# Patient Record
Sex: MALE | Race: WHITE | NOT HISPANIC OR LATINO | Employment: OTHER | URBAN - METROPOLITAN AREA
[De-identification: names, ages, dates, MRNs, and addresses within clinical notes are randomized per-mention and may not be internally consistent; named-entity substitution may affect disease eponyms.]

---

## 2018-01-03 ENCOUNTER — OFFICE VISIT CONVERTED (OUTPATIENT)
Dept: ORTHOPEDIC SURGERY | Facility: CLINIC | Age: 53
End: 2018-01-03
Attending: PHYSICIAN ASSISTANT

## 2018-01-31 ENCOUNTER — CONVERSION ENCOUNTER (OUTPATIENT)
Dept: ORTHOPEDIC SURGERY | Facility: CLINIC | Age: 53
End: 2018-01-31

## 2018-01-31 ENCOUNTER — OFFICE VISIT CONVERTED (OUTPATIENT)
Dept: ORTHOPEDIC SURGERY | Facility: CLINIC | Age: 53
End: 2018-01-31
Attending: PHYSICIAN ASSISTANT

## 2018-03-09 ENCOUNTER — OFFICE VISIT CONVERTED (OUTPATIENT)
Dept: ORTHOPEDIC SURGERY | Facility: CLINIC | Age: 53
End: 2018-03-09
Attending: PHYSICIAN ASSISTANT

## 2018-04-23 ENCOUNTER — OFFICE VISIT CONVERTED (OUTPATIENT)
Dept: ORTHOPEDIC SURGERY | Facility: CLINIC | Age: 53
End: 2018-04-23
Attending: PHYSICIAN ASSISTANT

## 2020-08-19 ENCOUNTER — HOSPITAL ENCOUNTER (OUTPATIENT)
Dept: URGENT CARE | Facility: CLINIC | Age: 55
Discharge: HOME | End: 2020-08-19
Attending: PHYSICIAN ASSISTANT

## 2020-08-22 LAB — SARS-COV-2 RNA SPEC QL NAA+PROBE: NOT DETECTED

## 2021-05-16 VITALS — BODY MASS INDEX: 34.46 KG/M2 | OXYGEN SATURATION: 97 % | WEIGHT: 260 LBS | HEIGHT: 73 IN | HEART RATE: 72 BPM

## 2021-05-16 VITALS — HEIGHT: 73 IN | HEART RATE: 93 BPM | WEIGHT: 260 LBS | BODY MASS INDEX: 34.46 KG/M2 | OXYGEN SATURATION: 95 %

## 2021-05-16 VITALS — BODY MASS INDEX: 34.72 KG/M2 | HEART RATE: 73 BPM | OXYGEN SATURATION: 97 % | WEIGHT: 262 LBS | HEIGHT: 73 IN

## 2021-05-26 ENCOUNTER — HOSPITAL ENCOUNTER (OUTPATIENT)
Dept: CARDIOLOGY | Facility: HOSPITAL | Age: 56
Discharge: HOME | End: 2021-05-26
Attending: PHYSICIAN ASSISTANT

## 2021-05-27 ENCOUNTER — HOSPITAL ENCOUNTER (OUTPATIENT)
Dept: OTHER | Facility: HOSPITAL | Age: 56
Discharge: HOME | End: 2021-05-27
Attending: PHYSICIAN ASSISTANT

## 2021-05-27 LAB
25(OH)D3 SERPL-MCNC: 25.8 NG/ML (ref 30–100)
ALBUMIN SERPL-MCNC: 4 G/DL (ref 3.5–5)
ALBUMIN/GLOB SERPL: 1 {RATIO} (ref 1.4–2.6)
ALP SERPL-CCNC: 69 U/L (ref 56–119)
ALT SERPL-CCNC: 40 U/L (ref 10–40)
ANION GAP SERPL CALC-SCNC: 11 MMOL/L (ref 8–19)
AST SERPL-CCNC: 41 U/L (ref 15–50)
BASOPHILS # BLD AUTO: 0.05 10*3/UL (ref 0–0.2)
BASOPHILS NFR BLD AUTO: 1.1 % (ref 0–3)
BILIRUB SERPL-MCNC: 0.92 MG/DL (ref 0.2–1.3)
BUN SERPL-MCNC: 11 MG/DL (ref 5–25)
BUN/CREAT SERPL: 11 {RATIO} (ref 6–20)
CALCIUM SERPL-MCNC: 9.2 MG/DL (ref 8.7–10.4)
CHLORIDE SERPL-SCNC: 105 MMOL/L (ref 99–111)
CHOLEST SERPL-MCNC: 165 MG/DL (ref 107–200)
CHOLEST/HDLC SERPL: 3.4 {RATIO} (ref 3–6)
CONV ABS IMM GRAN: 0.02 10*3/UL (ref 0–0.2)
CONV CO2: 27 MMOL/L (ref 22–32)
CONV IMMATURE GRAN: 0.4 % (ref 0–1.8)
CONV TOTAL PROTEIN: 7.9 G/DL (ref 6.3–8.2)
CREAT UR-MCNC: 1.02 MG/DL (ref 0.7–1.2)
DEPRECATED RDW RBC AUTO: 46 FL (ref 35.1–43.9)
EOSINOPHIL # BLD AUTO: 0.11 10*3/UL (ref 0–0.7)
EOSINOPHIL # BLD AUTO: 2.4 % (ref 0–7)
ERYTHROCYTE [DISTWIDTH] IN BLOOD BY AUTOMATED COUNT: 13.1 % (ref 11.6–14.4)
EST. AVERAGE GLUCOSE BLD GHB EST-MCNC: 117 MG/DL
GFR SERPLBLD BASED ON 1.73 SQ M-ARVRAT: >60 ML/MIN/{1.73_M2}
GLOBULIN UR ELPH-MCNC: 3.9 G/DL (ref 2–3.5)
GLUCOSE SERPL-MCNC: 109 MG/DL (ref 70–99)
HBA1C MFR BLD: 5.7 % (ref 3.5–5.7)
HCT VFR BLD AUTO: 43.7 % (ref 42–52)
HDLC SERPL-MCNC: 48 MG/DL (ref 40–60)
HGB BLD-MCNC: 14.6 G/DL (ref 14–18)
LDLC SERPL CALC-MCNC: 103 MG/DL (ref 70–100)
LYMPHOCYTES # BLD AUTO: 1.12 10*3/UL (ref 1–5)
LYMPHOCYTES NFR BLD AUTO: 24.3 % (ref 20–45)
MCH RBC QN AUTO: 31.8 PG (ref 27–31)
MCHC RBC AUTO-ENTMCNC: 33.4 G/DL (ref 33–37)
MCV RBC AUTO: 95.2 FL (ref 80–96)
MONOCYTES # BLD AUTO: 0.42 10*3/UL (ref 0.2–1.2)
MONOCYTES NFR BLD AUTO: 9.1 % (ref 3–10)
NEUTROPHILS # BLD AUTO: 2.88 10*3/UL (ref 2–8)
NEUTROPHILS NFR BLD AUTO: 62.7 % (ref 30–85)
NRBC CBCN: 0 % (ref 0–0.7)
OSMOLALITY SERPL CALC.SUM OF ELEC: 288 MOSM/KG (ref 273–304)
PLATELET # BLD AUTO: 131 10*3/UL (ref 130–400)
PMV BLD AUTO: 11.1 FL (ref 9.4–12.4)
POTASSIUM SERPL-SCNC: 4.4 MMOL/L (ref 3.5–5.3)
PSA SERPL-MCNC: 1.18 NG/ML (ref 0–4)
RBC # BLD AUTO: 4.59 10*6/UL (ref 4.7–6.1)
SODIUM SERPL-SCNC: 139 MMOL/L (ref 135–147)
T4 FREE SERPL-MCNC: 1.2 NG/DL (ref 0.9–1.8)
TRIGL SERPL-MCNC: 71 MG/DL (ref 40–150)
TSH SERPL-ACNC: 4.28 M[IU]/L (ref 0.27–4.2)
VLDLC SERPL-MCNC: 14 MG/DL (ref 5–37)
WBC # BLD AUTO: 4.6 10*3/UL (ref 4.8–10.8)

## 2021-06-02 ENCOUNTER — TRANSCRIBE ORDERS (OUTPATIENT)
Dept: ADMINISTRATIVE | Facility: HOSPITAL | Age: 56
End: 2021-06-02

## 2021-06-02 DIAGNOSIS — R07.89 ATYPICAL CHEST PAIN: Primary | ICD-10-CM

## 2021-06-17 ENCOUNTER — APPOINTMENT (OUTPATIENT)
Dept: NUCLEAR MEDICINE | Facility: HOSPITAL | Age: 56
End: 2021-06-17

## 2021-07-21 ENCOUNTER — OFFICE VISIT (OUTPATIENT)
Dept: CARDIOLOGY | Facility: CLINIC | Age: 56
End: 2021-07-21

## 2021-07-21 VITALS
DIASTOLIC BLOOD PRESSURE: 82 MMHG | HEIGHT: 73 IN | BODY MASS INDEX: 33.53 KG/M2 | HEART RATE: 62 BPM | WEIGHT: 253 LBS | SYSTOLIC BLOOD PRESSURE: 128 MMHG

## 2021-07-21 DIAGNOSIS — R07.2 PRECORDIAL PAIN: Primary | ICD-10-CM

## 2021-07-21 PROCEDURE — 99203 OFFICE O/P NEW LOW 30 MIN: CPT | Performed by: INTERNAL MEDICINE

## 2021-07-21 RX ORDER — TAMSULOSIN HYDROCHLORIDE 0.4 MG/1
1 CAPSULE ORAL DAILY
COMMUNITY
Start: 2021-06-16 | End: 2021-07-21 | Stop reason: SDUPTHER

## 2021-07-21 RX ORDER — MULTIPLE VITAMINS W/ MINERALS TAB 9MG-400MCG
1 TAB ORAL DAILY
COMMUNITY

## 2021-07-21 RX ORDER — ERGOCALCIFEROL 1.25 MG/1
50000 CAPSULE ORAL DAILY
COMMUNITY
End: 2022-03-31

## 2021-07-21 RX ORDER — TAMSULOSIN HYDROCHLORIDE 0.4 MG/1
1 CAPSULE ORAL DAILY
Qty: 30 CAPSULE | Refills: 2 | Status: SHIPPED | OUTPATIENT
Start: 2021-07-21 | End: 2022-03-31

## 2021-07-21 RX ORDER — LORAZEPAM 0.5 MG/1
0.5 TABLET ORAL AS NEEDED
COMMUNITY
Start: 2021-06-10 | End: 2022-03-31

## 2021-07-21 NOTE — PROGRESS NOTES
Chief Complaint  Establish Care and Chest Pain (becasue of anxiety; not currently having )    Subjective            Sherly Te Jo presents to [facility] CARDIOLOGY  History of Present Illness    56-year-old white male, he has no previous cardiac history.  He has had episodes of situational chest discomfort.  This occurs during conflict at work, associated with anxiety and panic.  It is not with physical exertion.  Since work-related changes his symptoms have improved.  He had a treadmill stress test recently which reproduced minor chest discomfort, but EKGs were normal.  He stays active.  He is making attempts to lose weight.    PMH  Past Medical History:   Diagnosis Date   • Acid reflux    • Bursitis     SHOULDER; ROTATOR CUFF, RIGHT   • Chronic allergic rhinitis    • GERD (gastroesophageal reflux disease)    • Prostate disorder    • Seasonal allergies          SURGICALHX  Past Surgical History:   Procedure Laterality Date   • APPENDECTOMY     • BACK SURGERY     • COLONOSCOPY     • ENDOSCOPY     • HERNIA REPAIR     • OTHER SURGICAL HISTORY      JOINT SURGERY   • OTHER SURGICAL HISTORY      trachiostomy X2   • REPLACEMENT TOTAL KNEE     • SHOULDER ARTHROSCOPY Right     FOR SAD/CD AND ROTATOR CUFF REPAIR ON 12/21/17        SOC  Social History     Socioeconomic History   • Marital status:      Spouse name: Not on file   • Number of children: Not on file   • Years of education: Not on file   • Highest education level: Not on file   Tobacco Use   • Smoking status: Never Smoker   • Smokeless tobacco: Former User     Types: Chew   Vaping Use   • Vaping Use: Never used   Substance and Sexual Activity   • Alcohol use: Yes     Comment: CURRENT SOME DAY   • Drug use: Never   • Sexual activity: Defer         FAMHX  Family History   Problem Relation Age of Onset   • No Known Problems Mother    • No Known Problems Father           ALLERGY  Allergies   Allergen Reactions   • Codeine Anaphylaxis     "    MEDSCURRENT    Current Outpatient Medications:   •  ergocalciferol (ERGOCALCIFEROL) 1.25 MG (65995 UT) capsule, Take 50,000 Units by mouth Daily., Disp: , Rfl:   •  LORazepam (ATIVAN) 0.5 MG tablet, Take 0.5 mg by mouth As Needed., Disp: , Rfl:   •  multivitamin with minerals (MULTIVITAMIN ADULT PO), Take 1 tablet by mouth Daily., Disp: , Rfl:   •  sertraline (ZOLOFT) 50 MG tablet, Take 50 mg by mouth Daily., Disp: , Rfl:   •  tamsulosin (FLOMAX) 0.4 MG capsule 24 hr capsule, Take 1 capsule by mouth Daily., Disp: , Rfl:       Review of Systems   Constitutional: Negative.   HENT: Negative.    Eyes: Negative.    Cardiovascular: Positive for chest pain.   Respiratory: Negative.    Endocrine: Negative.    Hematologic/Lymphatic: Negative.    Skin: Negative.    Musculoskeletal: Negative.    Gastrointestinal: Negative.    Genitourinary: Negative.    Neurological: Negative.    Psychiatric/Behavioral: Negative.         Objective     /82   Pulse 62   Ht 185.4 cm (73\")   Wt 115 kg (253 lb)   BMI 33.38 kg/m²       General Appearance:   · well developed  · well nourished, obese  HENT:   · oropharynx moist  · lips not cyanotic  Neck:  · thyroid not enlarged  · supple  Respiratory:  · no respiratory distress  · normal breath sounds  · no rales  Cardiovascular:  · no jugular venous distention  · regular rhythm  · apical impulse normal  · S1 normal, S2 normal  · no S3, no S4   · no murmur  · no rub, no thrill  · carotid pulses normal; no bruit  · pedal pulses normal  · lower extremity edema: none    Musculoskeletal:  · no clubbing of fingers.   · normocephalic, head atraumatic  Skin:   · warm, dry  Psychiatric:  · judgement and insight appropriate  · normal mood and affect      Result Review :     The following data was reviewed by: Levi Love MD on 07/21/2021:    CMP    CMP 5/27/21   Glucose 109 (A)   BUN 11   Creatinine 1.02   Sodium 139   Potassium 4.4   Chloride 105   Calcium 9.2   Albumin 4.0 "   Total Bilirubin 0.92   Alkaline Phosphatase 69   AST (SGOT) 41   ALT (SGPT) 40   (A) Abnormal value            CBC    CBC 5/27/21   WBC 4.60 (A)   RBC 4.59 (A)   Hemoglobin 14.6   Hematocrit 43.7   MCV 95.2   MCH 31.8 (A)   MCHC 33.4   RDW 13.1   Platelets 131   (A) Abnormal value            Lipid Panel    Lipid Panel 5/27/21   Total Cholesterol 165   Triglycerides 71   HDL Cholesterol 48   VLDL Cholesterol 14   LDL Cholesterol  103 (A)   (A) Abnormal value       Comments are available for some flowsheets but are not being displayed.           TSH    TSH 5/27/21   TSH 4.280 (A)   (A) Abnormal value              Data reviewed: Cardiology studies Treadmill stress test reviewed as above     Procedures               Assessment and Plan        ASSESSMENT:  Encounter Diagnoses   Name Primary?   • Precordial pain Yes   • BMI 33.0-33.9,adult          PLAN:    1.  The patient has chest discomfort in the setting of work conflict/anxiety/panic.  It is an atypical pattern not likely to be due to ischemic heart disease.  His treadmill test was unremarkable.  2.  At this time I recommend a watchful waiting approach no additional cardiac work-up is needed currently.  If he has recurrent symptoms out of the context of stress or anxiety stress imaging or coronary angiography can be considered.  3.  His estimated 10-year cardiovascular risk is 6.3% using the pooled cohort equation, currently I do not recommend statin or aspirin therapy in this patient.  However, based on age adjustment, once he is over 59 or 60 years old his risk will rise to above 7.5% and these preventative therapies should be reconsidered at that time through primary care.    He will be seen back as needed          Patient was given instructions and counseling regarding his condition or for health maintenance advice. Please see specific information pulled into the AVS if appropriate.             SHERRI Love MD  7/21/2021    14:51 EDT

## 2021-10-19 RX ORDER — TAMSULOSIN HYDROCHLORIDE 0.4 MG/1
1 CAPSULE ORAL DAILY
Qty: 30 CAPSULE | Refills: 2 | OUTPATIENT
Start: 2021-10-19

## 2021-11-17 ENCOUNTER — HOSPITAL ENCOUNTER (EMERGENCY)
Facility: HOSPITAL | Age: 56
Discharge: HOME | End: 2021-11-17
Attending: EMERGENCY MEDICINE | Admitting: EMERGENCY MEDICINE

## 2021-11-17 ENCOUNTER — APPOINTMENT (OUTPATIENT)
Dept: CT IMAGING | Facility: HOSPITAL | Age: 56
End: 2021-11-17

## 2021-11-17 VITALS
WEIGHT: 258.82 LBS | HEIGHT: 73 IN | SYSTOLIC BLOOD PRESSURE: 125 MMHG | OXYGEN SATURATION: 100 % | HEART RATE: 70 BPM | TEMPERATURE: 98.4 F | RESPIRATION RATE: 20 BRPM | DIASTOLIC BLOOD PRESSURE: 94 MMHG | BODY MASS INDEX: 34.3 KG/M2

## 2021-11-17 DIAGNOSIS — R31.29 OTHER MICROSCOPIC HEMATURIA: ICD-10-CM

## 2021-11-17 DIAGNOSIS — N20.1 URETEROLITHIASIS: Primary | ICD-10-CM

## 2021-11-17 PROBLEM — D72.819 LEUKOPENIA: Status: ACTIVE | Noted: 2021-11-17

## 2021-11-17 PROBLEM — M67.919 DISORDER OF BURSAE AND TENDONS IN SHOULDER REGION: Status: ACTIVE | Noted: 2017-08-24

## 2021-11-17 PROBLEM — N42.9 PROSTATE DISORDER: Status: ACTIVE | Noted: 2021-11-17

## 2021-11-17 PROBLEM — Z47.1 AFTERCARE FOLLOWING JOINT REPLACEMENT: Status: ACTIVE | Noted: 2018-03-09

## 2021-11-17 PROBLEM — N40.0 BENIGN PROSTATIC HYPERPLASIA WITHOUT URINARY OBSTRUCTION: Status: ACTIVE | Noted: 2021-11-17

## 2021-11-17 PROBLEM — Z98.890 OTHER POSTPROCEDURAL STATES: Status: ACTIVE | Noted: 2018-04-23

## 2021-11-17 PROBLEM — K21.9 ESOPHAGEAL REFLUX: Status: ACTIVE | Noted: 2021-11-17

## 2021-11-17 PROBLEM — G93.32 CHRONIC FATIGUE SYNDROME: Status: ACTIVE | Noted: 2021-11-17

## 2021-11-17 PROBLEM — J30.2 SEASONAL ALLERGIC RHINITIS: Status: ACTIVE | Noted: 2021-11-17

## 2021-11-17 PROBLEM — M71.9 DISORDER OF BURSAE AND TENDONS IN SHOULDER REGION: Status: ACTIVE | Noted: 2017-08-24

## 2021-11-17 PROBLEM — F41.1 GENERALIZED ANXIETY DISORDER: Status: ACTIVE | Noted: 2021-11-17

## 2021-11-17 LAB
ALBUMIN SERPL-MCNC: 4.1 G/DL (ref 3.5–5.2)
ALBUMIN/GLOB SERPL: 1.2 G/DL
ALP SERPL-CCNC: 79 U/L (ref 39–117)
ALT SERPL W P-5'-P-CCNC: 50 U/L (ref 1–41)
ANION GAP SERPL CALCULATED.3IONS-SCNC: 10.8 MMOL/L (ref 5–15)
AST SERPL-CCNC: 46 U/L (ref 1–40)
BACTERIA UR QL AUTO: ABNORMAL /HPF
BASOPHILS # BLD AUTO: 0.05 10*3/MM3 (ref 0–0.2)
BASOPHILS NFR BLD AUTO: 0.7 % (ref 0–1.5)
BILIRUB SERPL-MCNC: 1.5 MG/DL (ref 0–1.2)
BILIRUB UR QL STRIP: NEGATIVE
BUN SERPL-MCNC: 16 MG/DL (ref 6–20)
BUN/CREAT SERPL: 13.2 (ref 7–25)
CALCIUM SPEC-SCNC: 9.3 MG/DL (ref 8.6–10.5)
CHLORIDE SERPL-SCNC: 102 MMOL/L (ref 98–107)
CLARITY UR: CLEAR
CO2 SERPL-SCNC: 28.2 MMOL/L (ref 22–29)
COLOR UR: YELLOW
CREAT SERPL-MCNC: 1.21 MG/DL (ref 0.76–1.27)
DEPRECATED RDW RBC AUTO: 46.9 FL (ref 37–54)
EOSINOPHIL # BLD AUTO: 0.11 10*3/MM3 (ref 0–0.4)
EOSINOPHIL NFR BLD AUTO: 1.6 % (ref 0.3–6.2)
ERYTHROCYTE [DISTWIDTH] IN BLOOD BY AUTOMATED COUNT: 13.1 % (ref 12.3–15.4)
GFR SERPL CREATININE-BSD FRML MDRD: 62 ML/MIN/1.73
GLOBULIN UR ELPH-MCNC: 3.4 GM/DL
GLUCOSE SERPL-MCNC: 123 MG/DL (ref 65–99)
GLUCOSE UR STRIP-MCNC: NEGATIVE MG/DL
HCT VFR BLD AUTO: 42 % (ref 37.5–51)
HGB BLD-MCNC: 13.9 G/DL (ref 13–17.7)
HGB UR QL STRIP.AUTO: ABNORMAL
HOLD SPECIMEN: NORMAL
HOLD SPECIMEN: NORMAL
HYALINE CASTS UR QL AUTO: ABNORMAL /LPF
IMM GRANULOCYTES # BLD AUTO: 0.02 10*3/MM3 (ref 0–0.05)
IMM GRANULOCYTES NFR BLD AUTO: 0.3 % (ref 0–0.5)
KETONES UR QL STRIP: NEGATIVE
LEUKOCYTE ESTERASE UR QL STRIP.AUTO: NEGATIVE
LIPASE SERPL-CCNC: 27 U/L (ref 13–60)
LYMPHOCYTES # BLD AUTO: 1.24 10*3/MM3 (ref 0.7–3.1)
LYMPHOCYTES NFR BLD AUTO: 18.5 % (ref 19.6–45.3)
MCH RBC QN AUTO: 32.1 PG (ref 26.6–33)
MCHC RBC AUTO-ENTMCNC: 33.1 G/DL (ref 31.5–35.7)
MCV RBC AUTO: 97 FL (ref 79–97)
MONOCYTES # BLD AUTO: 0.67 10*3/MM3 (ref 0.1–0.9)
MONOCYTES NFR BLD AUTO: 10 % (ref 5–12)
NEUTROPHILS NFR BLD AUTO: 4.63 10*3/MM3 (ref 1.7–7)
NEUTROPHILS NFR BLD AUTO: 68.9 % (ref 42.7–76)
NITRITE UR QL STRIP: NEGATIVE
NRBC BLD AUTO-RTO: 0 /100 WBC (ref 0–0.2)
PH UR STRIP.AUTO: 5.5 [PH] (ref 5–8)
PLATELET # BLD AUTO: 118 10*3/MM3 (ref 140–450)
PMV BLD AUTO: 11.2 FL (ref 6–12)
POTASSIUM SERPL-SCNC: 3.7 MMOL/L (ref 3.5–5.2)
PROT SERPL-MCNC: 7.5 G/DL (ref 6–8.5)
PROT UR QL STRIP: ABNORMAL
RBC # BLD AUTO: 4.33 10*6/MM3 (ref 4.14–5.8)
RBC # UR STRIP: ABNORMAL /HPF
REF LAB TEST METHOD: ABNORMAL
SODIUM SERPL-SCNC: 141 MMOL/L (ref 136–145)
SP GR UR STRIP: 1.02 (ref 1–1.03)
SQUAMOUS #/AREA URNS HPF: ABNORMAL /HPF
UROBILINOGEN UR QL STRIP: ABNORMAL
WBC # UR STRIP: ABNORMAL /HPF
WBC NRBC COR # BLD: 6.72 10*3/MM3 (ref 3.4–10.8)
WHOLE BLOOD HOLD SPECIMEN: NORMAL
WHOLE BLOOD HOLD SPECIMEN: NORMAL

## 2021-11-17 PROCEDURE — 99283 EMERGENCY DEPT VISIT LOW MDM: CPT

## 2021-11-17 PROCEDURE — 85025 COMPLETE CBC W/AUTO DIFF WBC: CPT

## 2021-11-17 PROCEDURE — 83690 ASSAY OF LIPASE: CPT

## 2021-11-17 PROCEDURE — 96374 THER/PROPH/DIAG INJ IV PUSH: CPT

## 2021-11-17 PROCEDURE — 80053 COMPREHEN METABOLIC PANEL: CPT

## 2021-11-17 PROCEDURE — 81001 URINALYSIS AUTO W/SCOPE: CPT | Performed by: EMERGENCY MEDICINE

## 2021-11-17 PROCEDURE — 25010000002 KETOROLAC TROMETHAMINE PER 15 MG: Performed by: NURSE PRACTITIONER

## 2021-11-17 PROCEDURE — 74176 CT ABD & PELVIS W/O CONTRAST: CPT

## 2021-11-17 RX ORDER — KETOROLAC TROMETHAMINE 10 MG/1
10 TABLET, FILM COATED ORAL EVERY 6 HOURS PRN
Qty: 20 TABLET | Refills: 0 | Status: SHIPPED | OUTPATIENT
Start: 2021-11-17 | End: 2022-03-31

## 2021-11-17 RX ORDER — SODIUM CHLORIDE 0.9 % (FLUSH) 0.9 %
10 SYRINGE (ML) INJECTION AS NEEDED
Status: DISCONTINUED | OUTPATIENT
Start: 2021-11-17 | End: 2021-11-18 | Stop reason: HOSPADM

## 2021-11-17 RX ORDER — KETOROLAC TROMETHAMINE 30 MG/ML
30 INJECTION, SOLUTION INTRAMUSCULAR; INTRAVENOUS ONCE
Status: COMPLETED | OUTPATIENT
Start: 2021-11-17 | End: 2021-11-17

## 2021-11-17 RX ADMIN — KETOROLAC TROMETHAMINE 30 MG: 30 INJECTION, SOLUTION INTRAMUSCULAR at 19:46

## 2021-11-18 NOTE — ED PROVIDER NOTES
Subjective   Patient reports that for the past 3 days he has been having left flank pain, nausea, vomiting, diarrhea, fever.  He reports his highest fever at home was 101.2.  He states that the last time he vomited was on Monday and the last time he had diarrhea was on Tuesday.  He has no history of kidney stones but he was sent to the ED today with concerns by urgent care because they detected blood in his urine.      History provided by:  Patient   used: No        Review of Systems   Constitutional: Negative for chills and fever.   HENT: Negative for congestion, ear pain, rhinorrhea and sore throat.    Eyes: Negative for pain.   Respiratory: Negative for cough and shortness of breath.    Cardiovascular: Negative for chest pain.   Gastrointestinal: Positive for diarrhea (Resolved), nausea and vomiting (Resolved). Negative for abdominal pain.   Genitourinary: Positive for decreased urine volume and hematuria (Per urgent care). Negative for dysuria and flank pain (Left).   Musculoskeletal: Negative for arthralgias and myalgias.   Skin: Negative for rash.   Neurological: Negative for seizures and headaches.   All other systems reviewed and are negative.      Past Medical History:   Diagnosis Date   • Acid reflux    • Bursitis     SHOULDER; ROTATOR CUFF, RIGHT   • Chronic allergic rhinitis    • GERD (gastroesophageal reflux disease)    • Prostate disorder    • Seasonal allergies        Allergies   Allergen Reactions   • Codeine Anaphylaxis       Past Surgical History:   Procedure Laterality Date   • APPENDECTOMY     • BACK SURGERY     • COLONOSCOPY     • ENDOSCOPY     • HERNIA REPAIR     • OTHER SURGICAL HISTORY      JOINT SURGERY   • OTHER SURGICAL HISTORY      trachiostomy X2   • REPLACEMENT TOTAL KNEE     • SHOULDER ARTHROSCOPY Right     FOR SAD/CD AND ROTATOR CUFF REPAIR ON 12/21/17   • TRACHELECTOMY         Family History   Problem Relation Age of Onset   • No Known Problems Mother    • No  Known Problems Father        Social History     Socioeconomic History   • Marital status:    Tobacco Use   • Smoking status: Never Smoker   • Smokeless tobacco: Former User     Types: Chew   Vaping Use   • Vaping Use: Never used   Substance and Sexual Activity   • Alcohol use: Yes     Comment: CURRENT SOME DAY   • Drug use: Never   • Sexual activity: Defer           Objective   Physical Exam  Vitals and nursing note reviewed.   Constitutional:       General: He is not in acute distress.     Appearance: Normal appearance. He is normal weight. He is not ill-appearing, toxic-appearing or diaphoretic.   HENT:      Head: Normocephalic and atraumatic.      Right Ear: External ear normal.      Left Ear: External ear normal.   Eyes:      General: No scleral icterus.     Conjunctiva/sclera: Conjunctivae normal.      Pupils: Pupils are equal, round, and reactive to light.   Cardiovascular:      Rate and Rhythm: Normal rate and regular rhythm.      Heart sounds: Normal heart sounds.   Pulmonary:      Effort: Pulmonary effort is normal. No respiratory distress.      Breath sounds: Normal breath sounds.   Abdominal:      General: Abdomen is flat. Bowel sounds are normal. There is no distension.      Palpations: Abdomen is soft.      Tenderness: There is no abdominal tenderness. There is left CVA tenderness.   Musculoskeletal:         General: No swelling, tenderness, deformity or signs of injury. Normal range of motion.      Cervical back: Normal range of motion and neck supple.   Skin:     General: Skin is warm and dry.      Capillary Refill: Capillary refill takes less than 2 seconds.   Neurological:      General: No focal deficit present.      Mental Status: He is alert and oriented to person, place, and time.   Psychiatric:         Mood and Affect: Mood normal.         Behavior: Behavior normal.         Procedures           ED Course                                           MDM  Number of Diagnoses or Management  Options  Other microscopic hematuria: new and requires workup  Ureterolithiasis: new and requires workup     Amount and/or Complexity of Data Reviewed  Clinical lab tests: reviewed and ordered  Tests in the radiology section of CPT®: reviewed and ordered    Patient Progress  Patient progress: stable      Final diagnoses:   Ureterolithiasis   Other microscopic hematuria       ED Disposition  ED Disposition     ED Disposition Condition Comment    Discharge Stable           Dennys Sanders MD  [address]  [facility] [address]  [place] [id number] [phone]    Schedule an appointment as soon as possible for a visit       Adina Henao MD  [address] [place] [id number] [phone]      As needed         Medication List      New Prescriptions    ketorolac 10 MG tablet  Commonly known as: TORADOL  Take 1 tablet by mouth Every 6 (Six) Hours As Needed for Moderate Pain .           Where to Get Your Medications      These medications were sent to [id number] - [place], [place] - [address] AT [facility] - [phone]  - [phone]  [address] [facility] [address]    Hours: 24-hours Phone: [phone]   · ketorolac 10 MG tablet          Kristie Moore, JACQUELINE  11/18/21 0126

## 2022-03-29 NOTE — PROGRESS NOTES
CHIEF COMPLAINT  Sherly Jo presents to [facility] INTERNAL MEDICINE to Establish Care (Pt would like to establish care with  ) and Knot on shin  (Pt noticed a knot on left leg right below the knee about a week ago. Pt states it started getting sensitive and pt states it seems to be getting bigger. Pt also has a spot on right leg next to knee. )     HPI  56-year-old pleasant pleasant male here to establish care.    Past medical history significant for reflux esophagitis. chronic allergic rhinitis GERD prostate disorder/BPH and seasonal allergies.  Also with ureterolithiasis diagnosed November 2021 in the emergency room.    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   Surgical History: has a past surgical history that includes Shoulder arthroscopy (Right); Appendectomy; Back surgery; Colonoscopy; Esophagogastroduodenoscopy; Hernia repair; Other surgical history; Replacement total knee; Other surgical history; and Trachelectomy.   Family History: family history includes No Known Problems in his father and mother.   Social History: reports that he has never smoked. He has quit using smokeless tobacco.  His smokeless tobacco use included chew. He reports current alcohol use. He reports that he does not use drugs.  Social History     Social History Narrative   • Not on file         OBJECTIVE  Vital Signs  Vitals:    03/31/22 1334   BP: 135/84   BP Location: Left arm   Patient Position: Sitting   Cuff Size: Adult   Pulse: 61   Resp: 16   Temp: 98.1 °F (36.7 °C)   TempSrc: Temporal   SpO2: 99%   Weight: 118 kg (259 lb 6.4 oz)  "  Height: 185.4 cm (73\")      Body mass index is 34.22 kg/m².    Review of Systems   All other systems reviewed and are negative.     Except for left leg with knot 3 days-  Physical Exam  Vitals and nursing note reviewed.   Constitutional:       Appearance: Normal appearance.   HENT:      Head: Normocephalic and atraumatic.      Nose: Nose normal.   Eyes:      Extraocular Movements: Extraocular movements intact.      Pupils: Pupils are equal, round, and reactive to light.   Cardiovascular:      Rate and Rhythm: Normal rate and regular rhythm.   Pulmonary:      Effort: Pulmonary effort is normal.      Breath sounds: Normal breath sounds.   Abdominal:      General: Abdomen is flat.      Palpations: Abdomen is soft.   Musculoskeletal:      Cervical back: Normal range of motion and neck supple.   Skin:     General: Skin is warm and dry.      Comments: Right posterior thigh and posterior calf with large varicose veins slightly tender to palpation left medial lower leg with 2 and half centimeter nodule by medial upper tibia area also tender to palpation   Neurological:      General: No focal deficit present.      Mental Status: He is alert and oriented to person, place, and time.   Psychiatric:         Mood and Affect: Mood normal.         Behavior: Behavior normal.         RESULTS REVIEW  No results found for: PROBNP, BNP  CMP    CMP 5/27/21 11/17/21 3/31/22   Glucose 109 (A) 123 (A) 99   BUN 11 16 14   Creatinine 1.02 1.21 0.94   eGFR Non African Am  62    Sodium 139 141 139   Potassium 4.4 3.7 4.2   Chloride 105 102 106   Calcium 9.2 9.3 10.3   Albumin 4.0 4.10 4.70   Total Bilirubin 0.92 1.5 (A) 1.1   Alkaline Phosphatase 69 79 78   AST (SGOT) 41 46 (A) 47 (A)   ALT (SGPT) 40 50 (A) 61 (A)   (A) Abnormal value            CBC w/diff    CBC w/Diff 5/27/21 11/17/21   WBC 4.60 (A) 6.72   RBC 4.59 (A) 4.33   Hemoglobin 14.6 13.9   Hematocrit 43.7 42.0   MCV 95.2 97.0   MCH 31.8 (A) 32.1   MCHC 33.4 33.1   RDW 13.1 13.1 "   Platelets 131 118 (A)   Neutrophil Rel % 62.7 68.9   Immature Granulocyte Rel %  0.3   Lymphocyte Rel % 24.3 18.5 (A)   Monocyte Rel % 9.1 10.0   Eosinophil Rel % 2.4 1.6   Basophil Rel % 1.1 0.7   (A) Abnormal value             Lipid Panel    Lipid Panel 5/27/21 3/31/22   Total Cholesterol  165   Total Cholesterol 165    Triglycerides 71 80   HDL Cholesterol 48 47   VLDL Cholesterol 14 15   LDL Cholesterol  103 (A) 103 (A)   LDL/HDL Ratio  2.17   (A) Abnormal value       Comments are available for some flowsheets but are not being displayed.            Lab Results   Component Value Date    TSH 4.310 (H) 03/31/2022    TSH 4.280 (H) 05/27/2021      Lab Results   Component Value Date    FREET4 1.18 03/31/2022    FREET4 1.2 05/27/2021      A1C Last 3 Results    HGBA1C Last 3 Results 5/27/21 3/31/22   Hemoglobin A1C 5.7 5.80 (A)   (A) Abnormal value       Comments are available for some flowsheets but are not being displayed.            PSA    PSA 5/27/21   PSA 1.18            No Images in the past 120 days found..        ASSESSMENT & PLAN  Diagnoses and all orders for this visit:    1. Encounter to establish care (Primary)  -     Hepatitis C antibody; Future  -     CBC w AUTO Differential; Future  -     Comprehensive metabolic panel; Future  -     Lipid panel; Future  -     TSH+Free T4; Future  -     Vitamin D 25 hydroxy; Future  -     Hemoglobin A1c; Future    2. Seasonal allergic rhinitis, unspecified trigger  -     Hepatitis C antibody; Future  -     CBC w AUTO Differential; Future  -     Comprehensive metabolic panel; Future  -     Lipid panel; Future  -     TSH+Free T4; Future  -     Vitamin D 25 hydroxy; Future  -     Hemoglobin A1c; Future    3. Vitamin D deficiency  -     Hepatitis C antibody; Future  -     CBC w AUTO Differential; Future  -     Comprehensive metabolic panel; Future  -     Lipid panel; Future  -     TSH+Free T4; Future  -     Vitamin D 25 hydroxy; Future  -     Hemoglobin A1c; Future    4.  Nodule of skin of left lower extremity  -     XR Tibia Fibula 2 View Left; Future    5. Varicose veins of right lower extremity, unspecified whether complicated  -     Compression Stockings    6. Screening for cardiovascular condition    7. Prediabetes      Assessment and plan    Varicose veins right leg-recommend using compression stockings 20 to 30 mmHg thigh-high use daily try this for the next month avoid prolonged standing.  If symptoms still present consider vascular surgeon consult.    Left medial lower leg nodule with increased size-rule out bone involvement.  Check x-ray of left lower leg.  Use anti-inflammatories as needed    Screen for cardiovascular disease-check lipids comp TSH B12 folate vitamin D    Prediabetes-A1c equals 5.7 May 2021      Benign prostatic hyperplasia-no need for meds    GERD--resolved-not on meds    Seasonal allergic rhinitis-stable       4/4/2022 addendum  1-Vitamin D deficiency-start vitamin D 50,000 units once a week for 3 months recheck vitamin D levels in 3 months  2-TSH slightly elevated need to recheck TSH also in 3 months  3-Low platelets and slightly elevated liver function test-patient with EtOH use cautioned about drinking more than 1-2 beers daily or more than 1 glass of wine daily.  4-Left lower leg mass x-rays with no bony abnormalities ultrasound recommended.  Informed patient and will schedule if agreeable with patient.  Needs follow-up with me in 4 weeks to reassess left leg and possible ultrasound of leg if nodule has not gone away.    FOLLOW UP  Return in about 4 weeks (around 4/28/2022), or if symptoms worsen or fail to improve, for Recheck.    Patient was given instructions and counseling regarding his condition or for health maintenance advice. Please see specific information pulled into the AVS if appropriate.

## 2022-03-31 ENCOUNTER — LAB (OUTPATIENT)
Dept: LAB | Facility: HOSPITAL | Age: 57
End: 2022-03-31

## 2022-03-31 ENCOUNTER — OFFICE VISIT (OUTPATIENT)
Dept: INTERNAL MEDICINE | Facility: CLINIC | Age: 57
End: 2022-03-31

## 2022-03-31 VITALS
SYSTOLIC BLOOD PRESSURE: 135 MMHG | OXYGEN SATURATION: 99 % | HEART RATE: 61 BPM | DIASTOLIC BLOOD PRESSURE: 84 MMHG | WEIGHT: 259.4 LBS | TEMPERATURE: 98.1 F | RESPIRATION RATE: 16 BRPM | HEIGHT: 73 IN | BODY MASS INDEX: 34.38 KG/M2

## 2022-03-31 DIAGNOSIS — J30.2 SEASONAL ALLERGIC RHINITIS, UNSPECIFIED TRIGGER: ICD-10-CM

## 2022-03-31 DIAGNOSIS — Z76.89 ENCOUNTER TO ESTABLISH CARE: Primary | ICD-10-CM

## 2022-03-31 DIAGNOSIS — E55.9 VITAMIN D DEFICIENCY: ICD-10-CM

## 2022-03-31 DIAGNOSIS — R22.42 NODULE OF SKIN OF LEFT LOWER EXTREMITY: ICD-10-CM

## 2022-03-31 DIAGNOSIS — I83.91 VARICOSE VEINS OF RIGHT LOWER EXTREMITY, UNSPECIFIED WHETHER COMPLICATED: ICD-10-CM

## 2022-03-31 DIAGNOSIS — Z13.6 SCREENING FOR CARDIOVASCULAR CONDITION: ICD-10-CM

## 2022-03-31 DIAGNOSIS — Z76.89 ENCOUNTER TO ESTABLISH CARE: ICD-10-CM

## 2022-03-31 DIAGNOSIS — R73.03 PREDIABETES: ICD-10-CM

## 2022-03-31 PROBLEM — I83.811 VARICOSE VEINS OF RIGHT LOWER EXTREMITY WITH PAIN: Status: ACTIVE | Noted: 2022-03-31

## 2022-03-31 LAB
25(OH)D3 SERPL-MCNC: 20.6 NG/ML (ref 30–100)
ALBUMIN SERPL-MCNC: 4.7 G/DL (ref 3.5–5.2)
ALBUMIN/GLOB SERPL: 1.5 G/DL
ALP SERPL-CCNC: 78 U/L (ref 39–117)
ALT SERPL W P-5'-P-CCNC: 61 U/L (ref 1–41)
ANION GAP SERPL CALCULATED.3IONS-SCNC: 8.5 MMOL/L (ref 5–15)
AST SERPL-CCNC: 47 U/L (ref 1–40)
BASOPHILS # BLD AUTO: 0.05 10*3/MM3 (ref 0–0.2)
BASOPHILS NFR BLD AUTO: 1 % (ref 0–1.5)
BILIRUB SERPL-MCNC: 1.1 MG/DL (ref 0–1.2)
BUN SERPL-MCNC: 14 MG/DL (ref 6–20)
BUN/CREAT SERPL: 14.9 (ref 7–25)
CALCIUM SPEC-SCNC: 10.3 MG/DL (ref 8.6–10.5)
CHLORIDE SERPL-SCNC: 106 MMOL/L (ref 98–107)
CHOLEST SERPL-MCNC: 165 MG/DL (ref 0–200)
CO2 SERPL-SCNC: 24.5 MMOL/L (ref 22–29)
CREAT SERPL-MCNC: 0.94 MG/DL (ref 0.76–1.27)
DEPRECATED RDW RBC AUTO: 42.3 FL (ref 37–54)
EGFRCR SERPLBLD CKD-EPI 2021: 94 ML/MIN/1.73
EOSINOPHIL # BLD AUTO: 0.13 10*3/MM3 (ref 0–0.4)
EOSINOPHIL NFR BLD AUTO: 2.6 % (ref 0.3–6.2)
ERYTHROCYTE [DISTWIDTH] IN BLOOD BY AUTOMATED COUNT: 12.2 % (ref 12.3–15.4)
GLOBULIN UR ELPH-MCNC: 3.2 GM/DL
GLUCOSE SERPL-MCNC: 99 MG/DL (ref 65–99)
HBA1C MFR BLD: 5.8 % (ref 4.8–5.6)
HCT VFR BLD AUTO: 43.9 % (ref 37.5–51)
HCV AB SER DONR QL: NORMAL
HDLC SERPL-MCNC: 47 MG/DL (ref 40–60)
HGB BLD-MCNC: 15 G/DL (ref 13–17.7)
IMM GRANULOCYTES # BLD AUTO: 0.01 10*3/MM3 (ref 0–0.05)
IMM GRANULOCYTES NFR BLD AUTO: 0.2 % (ref 0–0.5)
LDLC SERPL CALC-MCNC: 103 MG/DL (ref 0–100)
LDLC/HDLC SERPL: 2.17 {RATIO}
LYMPHOCYTES # BLD AUTO: 1.3 10*3/MM3 (ref 0.7–3.1)
LYMPHOCYTES NFR BLD AUTO: 25.8 % (ref 19.6–45.3)
MCH RBC QN AUTO: 32.5 PG (ref 26.6–33)
MCHC RBC AUTO-ENTMCNC: 34.2 G/DL (ref 31.5–35.7)
MCV RBC AUTO: 95.2 FL (ref 79–97)
MONOCYTES # BLD AUTO: 0.54 10*3/MM3 (ref 0.1–0.9)
MONOCYTES NFR BLD AUTO: 10.7 % (ref 5–12)
NEUTROPHILS NFR BLD AUTO: 3 10*3/MM3 (ref 1.7–7)
NEUTROPHILS NFR BLD AUTO: 59.7 % (ref 42.7–76)
NRBC BLD AUTO-RTO: 0 /100 WBC (ref 0–0.2)
PLATELET # BLD AUTO: 134 10*3/MM3 (ref 140–450)
PMV BLD AUTO: 11.8 FL (ref 6–12)
POTASSIUM SERPL-SCNC: 4.2 MMOL/L (ref 3.5–5.2)
PROT SERPL-MCNC: 7.9 G/DL (ref 6–8.5)
RBC # BLD AUTO: 4.61 10*6/MM3 (ref 4.14–5.8)
SODIUM SERPL-SCNC: 139 MMOL/L (ref 136–145)
T4 FREE SERPL-MCNC: 1.18 NG/DL (ref 0.93–1.7)
TRIGL SERPL-MCNC: 80 MG/DL (ref 0–150)
TSH SERPL DL<=0.05 MIU/L-ACNC: 4.31 UIU/ML (ref 0.27–4.2)
VLDLC SERPL-MCNC: 15 MG/DL (ref 5–40)
WBC NRBC COR # BLD: 5.03 10*3/MM3 (ref 3.4–10.8)

## 2022-03-31 PROCEDURE — 84439 ASSAY OF FREE THYROXINE: CPT

## 2022-03-31 PROCEDURE — 80061 LIPID PANEL: CPT

## 2022-03-31 PROCEDURE — 36415 COLL VENOUS BLD VENIPUNCTURE: CPT

## 2022-03-31 PROCEDURE — 83036 HEMOGLOBIN GLYCOSYLATED A1C: CPT

## 2022-03-31 PROCEDURE — 80050 GENERAL HEALTH PANEL: CPT

## 2022-03-31 PROCEDURE — 86803 HEPATITIS C AB TEST: CPT

## 2022-03-31 PROCEDURE — 99203 OFFICE O/P NEW LOW 30 MIN: CPT | Performed by: INTERNAL MEDICINE

## 2022-03-31 PROCEDURE — 82306 VITAMIN D 25 HYDROXY: CPT

## 2022-03-31 RX ORDER — NEOMYCIN SULFATE, POLYMYXIN B SULFATE AND DEXAMETHASONE 3.5; 10000; 1 MG/ML; [USP'U]/ML; MG/ML
SUSPENSION/ DROPS OPHTHALMIC
COMMUNITY
Start: 2022-03-10 | End: 2022-03-31

## 2022-03-31 NOTE — PATIENT INSTRUCTIONS
Get compression stockings and use daily for the next 3 to 4 weeks avoid prolonged standing.  Get x-rays of the left lower leg.  Can use anti-inflammatories as needed such as Aleve Advil ibuprofen up to 400 mg as needed for pain follow-up in 3 to 4 weeks do lab work as directed.

## 2022-04-01 ENCOUNTER — HOSPITAL ENCOUNTER (OUTPATIENT)
Dept: GENERAL RADIOLOGY | Facility: HOSPITAL | Age: 57
Discharge: HOME | End: 2022-04-01
Admitting: INTERNAL MEDICINE

## 2022-04-01 ENCOUNTER — PATIENT ROUNDING (BHMG ONLY) (OUTPATIENT)
Dept: INTERNAL MEDICINE | Facility: CLINIC | Age: 57
End: 2022-04-01

## 2022-04-01 DIAGNOSIS — R22.42 NODULE OF SKIN OF LEFT LOWER EXTREMITY: ICD-10-CM

## 2022-04-01 PROCEDURE — 73590 X-RAY EXAM OF LOWER LEG: CPT

## 2022-04-01 NOTE — PROGRESS NOTES
April 1, 2022    Hello, may I speak with Sherly Jo?    My name is Isaias Gallagher      I am  with [facility]  [facility] INTERNAL MEDICINE  [address] [address].    Before we get started may I verify your date of birth? 1965    I am calling to officially welcome you to our practice and ask about your recent visit. Is this a good time to talk? yes    Tell me about your visit with us. What things went well? it all went well, she was great       We're always looking for ways to make our patients' experiences even better. Do you have recommendations on ways we may improve?  no    Overall were you satisfied with your first visit to our practice? yes       I appreciate you taking the time to speak with me today. Is there anything else I can do for you? no      Thank you, and have a great day.

## 2022-04-04 ENCOUNTER — DOCUMENTATION (OUTPATIENT)
Dept: INTERNAL MEDICINE | Facility: CLINIC | Age: 57
End: 2022-04-04

## 2022-04-04 RX ORDER — ERGOCALCIFEROL 1.25 MG/1
50000 CAPSULE ORAL WEEKLY
Qty: 12 CAPSULE | Refills: 1 | Status: SHIPPED | OUTPATIENT
Start: 2022-04-04 | End: 2022-12-28

## 2022-04-04 NOTE — PROGRESS NOTES
I tried to call patient without success.  Please inform patient his 1)electrolyte panel was normal but his A1c was 5.8 still consistent with being a prediabetic -he needs to follow a low carbohydrate diet.  2)Also vitamin D levels were low at 20 and can cause him to feel fatigued and tired.  We will E scribe vitamin D tablets to take once a week for 3 months and we will need to recheck his vitamin D levels then.  3)His platelets were a little bit low -he has had this in the past and liver function tests were slightly elevated.  Recent alcohol use can cause this -recommendation is 1-2 beers daily or 1 glass of wine at night.  Monitor use.  4)X-ray of the lower leg did not show any bone mass.  Ultrasound of that area is recommended if the mass does not go away.  Please let me know if he wants to pursue this and I will order it.  5)Make appointment to see me back in 1 month to check on his leg and will need to do ultrasound if not better.

## 2022-04-27 PROBLEM — D69.6 THROMBOCYTOPENIA: Status: ACTIVE | Noted: 2022-04-27

## 2022-04-27 PROBLEM — E55.9 VITAMIN D DEFICIENCY: Status: ACTIVE | Noted: 2022-04-27

## 2022-04-27 NOTE — ASSESSMENT & PLAN NOTE
On high-dose vitamin D once a week for the next 3 months then start vitamin D 1000 units daily..  Recheck vitamin D levels then.

## 2022-04-27 NOTE — ASSESSMENT & PLAN NOTE
Baseline is 120,000 from last CBC in 2019.  Liver function tests-AST and ALT slightly elevated at 61 and 47 similar to 5 months ago but was within normal limits last year.  Hepatitis C antibody was negative.  Etiology may be from low vitamin D.  Continue with high-dose vitamin D supplements.  We will recheck CBC and also check B12 in 3 months.

## 2022-04-27 NOTE — PROGRESS NOTES
"CHIEF COMPLAINT  Sherly Jo presents to [facility] for follow-up of Follow-up (4 week knot on left leg and verco view in right leg.  ) and Pain (Patient is waking up with his hands tingling and numb and does not have a  like he use too.).  Also here for follow-up of labs.    ABBI Dubois is a 57-year-old male last seen March 31, 2022 and here for f/u of visit , new concerns, and discuss labs.    Left lower leg nodule- X-rays of the left lower leg were unremarkable for any mass or other significant abnormalities.  Nodule has decreased in size to about 1 cm and is nontender.    C/o tingling of both hands for the past 2 weeks.-New concern.  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    Thrombocytopenia- est dx-platelets were 134,000 and low 3 weeks ago.  5 months ago they were 118,000 and in 2019 were also low at 120,000.    Vitamin D deficiency-low at 23 weeks ago and previous 1 was also low 1 year ago 25 patient was given high-dose vitamin D tablets.    PFSH reviewed.  ROS negative    OBJECTIVE  Vital Signs  Vitals:    04/28/22 1025   BP: 115/80   BP Location: Left arm   Patient Position: Sitting   Cuff Size: Large Adult   Pulse: 62   Temp: 98.1 °F (36.7 °C)   SpO2: 95%   Weight: 115 kg (254 lb 9.6 oz)   Height: 185.4 cm (73\")      Body mass index is 33.59 kg/m².    Physical Exam  Vitals and nursing note reviewed.   Constitutional:       Appearance: Normal appearance.   HENT:      Head: Normocephalic and atraumatic.      Nose: Nose normal.   Eyes:      Extraocular Movements: Extraocular movements intact.      Pupils: Pupils are equal, round, and reactive to light.   Cardiovascular:      Rate and Rhythm: Normal rate and regular rhythm.   Pulmonary:      Effort: Pulmonary effort is normal.      " Breath sounds: Normal breath sounds.   Abdominal:      General: Abdomen is flat.      Palpations: Abdomen is soft.   Musculoskeletal:         General: Normal range of motion.      Cervical back: Normal range of motion and neck supple.   Skin:     General: Skin is warm and dry.      Comments: Left leg nodule decreased in size about 1 cm and nontender   Neurological:      General: No focal deficit present.      Mental Status: He is alert and oriented to person, place, and time.      Comments: Positive Tinel's sign   Psychiatric:         Mood and Affect: Mood normal.         Behavior: Behavior normal.          RESULTS REVIEW  No results found for: PROBNP, BNP  CMP    CMP 5/27/21 11/17/21 3/31/22   Glucose 109 (A) 123 (A) 99   BUN 11 16 14   Creatinine 1.02 1.21 0.94   eGFR Non African Am  62    Sodium 139 141 139   Potassium 4.4 3.7 4.2   Chloride 105 102 106   Calcium 9.2 9.3 10.3   Albumin 4.0 4.10 4.70   Total Bilirubin 0.92 1.5 (A) 1.1   Alkaline Phosphatase 69 79 78   AST (SGOT) 41 46 (A) 47 (A)   ALT (SGPT) 40 50 (A) 61 (A)   (A) Abnormal value            CBC w/diff    CBC w/Diff 5/27/21 11/17/21 3/31/22   WBC 4.60 (A) 6.72 5.03   RBC 4.59 (A) 4.33 4.61   Hemoglobin 14.6 13.9 15.0   Hematocrit 43.7 42.0 43.9   MCV 95.2 97.0 95.2   MCH 31.8 (A) 32.1 32.5   MCHC 33.4 33.1 34.2   RDW 13.1 13.1 12.2 (A)   Platelets 131 118 (A) 134 (A)   Neutrophil Rel % 62.7 68.9 59.7   Immature Granulocyte Rel %  0.3 0.2   Lymphocyte Rel % 24.3 18.5 (A) 25.8   Monocyte Rel % 9.1 10.0 10.7   Eosinophil Rel % 2.4 1.6 2.6   Basophil Rel % 1.1 0.7 1.0   (A) Abnormal value             Lipid Panel    Lipid Panel 5/27/21 3/31/22   Total Cholesterol  165   Total Cholesterol 165    Triglycerides 71 80   HDL Cholesterol 48 47   VLDL Cholesterol 14 15   LDL Cholesterol  103 (A) 103 (A)   LDL/HDL Ratio  2.17   (A) Abnormal value       Comments are available for some flowsheets but are not being displayed.            Lab Results   Component  Value Date    TSH 4.310 (H) 03/31/2022    TSH 4.280 (H) 05/27/2021      Lab Results   Component Value Date    FREET4 1.18 03/31/2022    FREET4 1.2 05/27/2021      A1C Last 3 Results    HGBA1C Last 3 Results 5/27/21 3/31/22   Hemoglobin A1C 5.7 5.80 (A)   (A) Abnormal value       Comments are available for some flowsheets but are not being displayed.            Lab Results   Component Value Date    HHMS65RL 20.6 (L) 03/31/2022     PSA    PSA 5/27/21   PSA 1.18            XR Tibia Fibula 2 View Left    Result Date: 4/1/2022    1. No acute osseous abnormality.  No radiographic abnormality corresponding to a mass within the medial lower leg.  Recommend either focused soft tissue ultrasound or MRI of the area of interest for further characterization.       FUNMILAYO ELISE MD       Electronically Signed and Approved By: FUNMILAYO ELISE MD on 4/01/2022 at 14:06                        ASSESSMENT & PLAN  Diagnoses and all orders for this visit:    1. Carpal tunnel syndrome, bilateral (Primary)  Assessment & Plan:  Probable carpal tunnel syndrome.  Pathology.  Wear wrist splints every night and as much as possible during the day.  Advised to get the ProCare quick fit wrist II for both wrists.  Decreased rapid repetitive activities is much as possible.  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  If symptoms do not get better after 3 to 4 weeks explained that we will need to refer for an EMG nerve conduction velocity study/Neuro referral.      2. Paresthesia of both hands  Comments:  Probable carpal tunnel syndrome  Assessment & Plan:  Probable carpal tunnel syndrome.  See above assessment and plan.  We will also check B12 in future.      Orders:  -     Vitamin B12; Future  -     Folate; Future    3. Thrombocytopenia (HCC)  Assessment & Plan:  Baseline is 120,000 from  last CBC in 2019.  Liver function tests-AST and ALT slightly elevated at 61 and 47 similar to 5 months ago but was within normal limits last year.  Hepatitis C antibody was negative.  Etiology may be from low vitamin D.  Continue with high-dose vitamin D supplements.  We will recheck CBC and also check B12 in 3 months.    Orders:  -     CBC & Differential; Future    4. Vitamin D deficiency  Assessment & Plan:  On high-dose vitamin D once a week for the next 3 months then start vitamin D 1000 units daily..  Recheck vitamin D levels then.    Orders:  -     Vitamin D 25 Hydroxy; Future    5. Nodule of skin of left lower extremity  Comments:  better and decreasing in size     Assessment plan  COVID booster today and Tdap  Wear wrist splints daily as discussed  Decrease repetitive activities  F/u 3 months or sooner if needed and recheck Vit D then, and B12    4/4/2022 addendum  1-Vitamin D deficiency-start vitamin D 50,000 units once a week for 3 months recheck vitamin D levels in 3 months  2-TSH slightly elevated need to recheck TSH also in 3 months  3-Low platelets and slightly elevated liver function test-patient with EtOH use cautioned about drinking more than 1-2 beers daily or more than 1 glass of wine daily.  4-Left lower leg mass x-rays with no bony abnormalities ultrasound recommended.  Informed patient and will schedule if agreeable with patient.  Needs follow-up with me in 4 weeks to reassess left leg and possible ultrasound of leg if nodule has not gone away    FOLLOW UP  Return in about 3 months (around 7/28/2022).    Patient was given instructions and counseling regarding his condition or for health maintenance advice. Please see specific information pulled into the AVS if appropriate.

## 2022-04-28 ENCOUNTER — OFFICE VISIT (OUTPATIENT)
Dept: INTERNAL MEDICINE | Facility: CLINIC | Age: 57
End: 2022-04-28

## 2022-04-28 VITALS
OXYGEN SATURATION: 95 % | HEART RATE: 62 BPM | TEMPERATURE: 98.1 F | HEIGHT: 73 IN | WEIGHT: 254.6 LBS | BODY MASS INDEX: 33.74 KG/M2 | DIASTOLIC BLOOD PRESSURE: 80 MMHG | SYSTOLIC BLOOD PRESSURE: 115 MMHG

## 2022-04-28 DIAGNOSIS — Z23 COVID-19 VACCINE REGIMEN TO MAINTAIN IMMUNITY STARTED: ICD-10-CM

## 2022-04-28 DIAGNOSIS — G56.03 CARPAL TUNNEL SYNDROME, BILATERAL: Primary | ICD-10-CM

## 2022-04-28 DIAGNOSIS — D69.6 THROMBOCYTOPENIA: ICD-10-CM

## 2022-04-28 DIAGNOSIS — R20.2 PARESTHESIA OF BOTH HANDS: ICD-10-CM

## 2022-04-28 DIAGNOSIS — E55.9 VITAMIN D DEFICIENCY: ICD-10-CM

## 2022-04-28 DIAGNOSIS — R22.42 NODULE OF SKIN OF LEFT LOWER EXTREMITY: ICD-10-CM

## 2022-04-28 PROCEDURE — 90715 TDAP VACCINE 7 YRS/> IM: CPT | Performed by: INTERNAL MEDICINE

## 2022-04-28 PROCEDURE — 99214 OFFICE O/P EST MOD 30 MIN: CPT | Performed by: INTERNAL MEDICINE

## 2022-04-28 PROCEDURE — 90471 IMMUNIZATION ADMIN: CPT | Performed by: INTERNAL MEDICINE

## 2022-04-28 PROCEDURE — 0053A COVID-19 (PFIZER): CPT | Performed by: INTERNAL MEDICINE

## 2022-04-28 PROCEDURE — 91305 COVID-19 (PFIZER): CPT | Performed by: INTERNAL MEDICINE

## 2022-04-28 NOTE — PATIENT INSTRUCTIONS
Okay to use wrist splints every night.  Decrease repetitive activities.  Check vitamin D, B12 in 3 months follow-up at that time.

## 2022-04-28 NOTE — ASSESSMENT & PLAN NOTE
Probable carpal tunnel syndrome.  See above assessment and plan.  We will also check B12 in future.

## 2022-04-28 NOTE — ASSESSMENT & PLAN NOTE
Probable carpal tunnel syndrome.  Pathology.  Wear wrist splints every night and as much as possible during the day.  Advised to get the ProCare quick fit wrist II for both wrists.  Decreased rapid repetitive activities is much as possible.  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  If symptoms do not get better after 3 to 4 weeks explained that we will need to refer for an EMG nerve conduction velocity study/Neuro referral.

## 2022-09-21 NOTE — PROGRESS NOTES
"CHIEF COMPLAINT  Sherly Jo presents to [facility] for follow-up of Hand Pain (Fingers locking in place. Index fingers  and thumb on both hands. He states they are locking and going numb. He states he's tried the dry needling and it hasn't helped. He says he has no strength in his hands. He states he's loosing ability to . He states the locking has been going on for about a month, and the numbness since April. The compression sleeve for his hand isnt helping either.  He also has a varicose vein on the right leg that is still bothering him. ).    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  ROS-numbness of hands and locking up of both hands    OBJECTIVE  Vital Signs  Vitals:    09/22/22 0908   BP: 119/79   BP Location: Left arm   Patient Position: Sitting   Cuff Size: Large Adult   Pulse: 78   Temp: 98.6 °F (37 °C)   TempSrc: Temporal   SpO2: 97%   Weight: 118 kg (259 lb 6.4 oz)   Height: 185.4 cm (73\")      Body mass index is 34.22 kg/m².    Physical Exam  Vitals and nursing note reviewed.   Constitutional:       Appearance: Normal appearance.   HENT:      Head: Normocephalic and atraumatic.      Nose: Nose normal.   Eyes:      Extraocular Movements: Extraocular movements intact.      Pupils: Pupils are equal, round, and reactive to light.   Cardiovascular:      Rate and Rhythm: Normal rate and regular rhythm.   Pulmonary:      Effort: Pulmonary effort is normal.      Breath sounds: Normal breath sounds.   Abdominal:      General: Abdomen is flat.      Palpations: Abdomen is soft.   Musculoskeletal:      Cervical back: Normal range of motion and neck supple.      Comments: Weak left hand-locking up of both hands   Skin:     General: Skin is warm and " dry.   Neurological:      General: No focal deficit present.      Mental Status: He is alert and oriented to person, place, and time.   Psychiatric:         Mood and Affect: Mood normal.         Behavior: Behavior normal.          RESULTS REVIEW  No results found for: PROBNP, BNP  CMP    CMP 11/17/21 3/31/22   Glucose 123 (A) 99   BUN 16 14   Creatinine 1.21 0.94   eGFR Non African Am 62    Sodium 141 139   Potassium 3.7 4.2   Chloride 102 106   Calcium 9.3 10.3   Albumin 4.10 4.70   Total Bilirubin 1.5 (A) 1.1   Alkaline Phosphatase 79 78   AST (SGOT) 46 (A) 47 (A)   ALT (SGPT) 50 (A) 61 (A)   (A) Abnormal value            CBC w/diff    CBC w/Diff 11/17/21 3/31/22   WBC 6.72 5.03   RBC 4.33 4.61   Hemoglobin 13.9 15.0   Hematocrit 42.0 43.9   MCV 97.0 95.2   MCH 32.1 32.5   MCHC 33.1 34.2   RDW 13.1 12.2 (A)   Platelets 118 (A) 134 (A)   Neutrophil Rel % 68.9 59.7   Immature Granulocyte Rel % 0.3 0.2   Lymphocyte Rel % 18.5 (A) 25.8   Monocyte Rel % 10.0 10.7   Eosinophil Rel % 1.6 2.6   Basophil Rel % 0.7 1.0   (A) Abnormal value             Lipid Panel    Lipid Panel 3/31/22   Total Cholesterol 165   Triglycerides 80   HDL Cholesterol 47   VLDL Cholesterol 15   LDL Cholesterol  103 (A)   LDL/HDL Ratio 2.17   (A) Abnormal value             Lab Results   Component Value Date    TSH 4.310 (H) 03/31/2022    TSH 4.280 (H) 05/27/2021      Lab Results   Component Value Date    FREET4 1.18 03/31/2022    FREET4 1.2 05/27/2021      A1C Last 3 Results    HGBA1C Last 3 Results 3/31/22   Hemoglobin A1C 5.80 (A)   (A) Abnormal value             Lab Results   Component Value Date    YTDS46RX 20.6 (L) 03/31/2022        No Images in the past 120 days found..             ASSESSMENT & PLAN  Diagnoses and all orders for this visit:    1. Trigger finger of right hand, unspecified finger (Primary)  Comments:  can try splint/ibuprofen prn/refer to Hand surgeon  Orders:  -     Ambulatory Referral to Hand Surgery  -     Comprehensive  Metabolic Panel; Future  -     Lipid Panel; Future  -     CBC & Differential; Future  -     Vitamin B12; Future  -     Folate; Future  -     Vitamin D 25 Hydroxy; Future  -     Magnesium; Future  -     Microalbumin / Creatinine Urine Ratio - Urine, Clean Catch; Future  -     Hemoglobin A1c; Future  -     TSH+Free T4; Future  -     T3, Free; Future    2. Numbness and tingling in both hands  Comments:  needs EMG/NCV study and appt with Neuro  Orders:  -     Ambulatory Referral to Neurology  -     EMG & Nerve Conduction Test; Future  -     Comprehensive Metabolic Panel; Future  -     Lipid Panel; Future  -     CBC & Differential; Future  -     Vitamin B12; Future  -     Folate; Future  -     Vitamin D 25 Hydroxy; Future  -     Magnesium; Future  -     Microalbumin / Creatinine Urine Ratio - Urine, Clean Catch; Future  -     Hemoglobin A1c; Future  -     TSH+Free T4; Future  -     T3, Free; Future    3. Trigger finger of left hand, unspecified finger  Comments:  can try splint/ibuprofen prn/refer to Hand surgeon  Orders:  -     Ambulatory Referral to Hand Surgery  -     Comprehensive Metabolic Panel; Future  -     Lipid Panel; Future  -     CBC & Differential; Future  -     Vitamin B12; Future  -     Folate; Future  -     Vitamin D 25 Hydroxy; Future  -     Magnesium; Future  -     Microalbumin / Creatinine Urine Ratio - Urine, Clean Catch; Future  -     Hemoglobin A1c; Future  -     TSH+Free T4; Future  -     T3, Free; Future    4. Vitamin D deficiency  Comments:  take vit D 2000 u daily  Orders:  -     Comprehensive Metabolic Panel; Future  -     Lipid Panel; Future  -     CBC & Differential; Future  -     Vitamin B12; Future  -     Folate; Future  -     Vitamin D 25 Hydroxy; Future  -     Magnesium; Future  -     Microalbumin / Creatinine Urine Ratio - Urine, Clean Catch; Future  -     Hemoglobin A1c; Future  -     TSH+Free T4; Future  -     T3, Free; Future    PLAN  Declined labs today  Call re appts if not heard in  2 weeks-re: Neuro/[facility] and [facility]  F/u as discussed-4 month check up      FOLLOW UP  Return in about 4 months (around 1/22/2023) for Recheck.    Patient was given instructions and counseling regarding his condition or for health maintenance advice. Please see specific information pulled into the AVS if appropriate.

## 2022-09-22 ENCOUNTER — OFFICE VISIT (OUTPATIENT)
Dept: INTERNAL MEDICINE | Facility: CLINIC | Age: 57
End: 2022-09-22

## 2022-09-22 VITALS
DIASTOLIC BLOOD PRESSURE: 79 MMHG | SYSTOLIC BLOOD PRESSURE: 119 MMHG | OXYGEN SATURATION: 97 % | HEART RATE: 78 BPM | WEIGHT: 259.4 LBS | HEIGHT: 73 IN | BODY MASS INDEX: 34.38 KG/M2 | TEMPERATURE: 98.6 F

## 2022-09-22 DIAGNOSIS — E55.9 VITAMIN D DEFICIENCY: ICD-10-CM

## 2022-09-22 DIAGNOSIS — R20.2 NUMBNESS AND TINGLING IN BOTH HANDS: ICD-10-CM

## 2022-09-22 DIAGNOSIS — R20.0 NUMBNESS AND TINGLING IN BOTH HANDS: ICD-10-CM

## 2022-09-22 DIAGNOSIS — M65.30 TRIGGER FINGER OF RIGHT HAND, UNSPECIFIED FINGER: Primary | ICD-10-CM

## 2022-09-22 DIAGNOSIS — M65.30 TRIGGER FINGER OF LEFT HAND, UNSPECIFIED FINGER: ICD-10-CM

## 2022-09-22 PROCEDURE — 99214 OFFICE O/P EST MOD 30 MIN: CPT | Performed by: INTERNAL MEDICINE

## 2022-09-26 ENCOUNTER — TELEPHONE (OUTPATIENT)
Dept: INTERNAL MEDICINE | Facility: CLINIC | Age: 57
End: 2022-09-26

## 2022-09-26 NOTE — TELEPHONE ENCOUNTER
Caller: DeysiSherly    Relationship: Self    Best call back number: [phone]    Who are you requesting to speak with (clinical staff, provider,  specific staff member): MEDICAL STAFF    What was the call regarding: PATIENT WAS TOLD TO CALL THE PROVIDER AND LET HER KNOW WHEN HE SCHEDULES HIS NEUROLOGY APPOINTMENT. HE IS SCHEDULED FOR 01/12/2023 WITH HER . HE WOULD LIKE TO KNOW IF THERE IS ANY WAY HE CAN BE SEEN SOONER.

## 2022-10-05 ENCOUNTER — PROCEDURE VISIT (OUTPATIENT)
Dept: NEUROLOGY | Facility: CLINIC | Age: 57
End: 2022-10-05

## 2022-10-05 VITALS
SYSTOLIC BLOOD PRESSURE: 133 MMHG | HEART RATE: 71 BPM | HEIGHT: 73 IN | WEIGHT: 261 LBS | BODY MASS INDEX: 34.59 KG/M2 | DIASTOLIC BLOOD PRESSURE: 74 MMHG

## 2022-10-05 DIAGNOSIS — G56.03 BILATERAL CARPAL TUNNEL SYNDROME: Primary | ICD-10-CM

## 2022-10-05 DIAGNOSIS — M65.322 ACQUIRED TRIGGER FINGER OF LEFT INDEX FINGER: ICD-10-CM

## 2022-10-05 PROCEDURE — 99202 OFFICE O/P NEW SF 15 MIN: CPT | Performed by: PSYCHIATRY & NEUROLOGY

## 2022-10-05 PROCEDURE — 95910 NRV CNDJ TEST 7-8 STUDIES: CPT | Performed by: PSYCHIATRY & NEUROLOGY

## 2022-10-05 NOTE — ASSESSMENT & PLAN NOTE
Nerve conduction study is abnormal shows electrophysiologic evidence for moderate to severe bilateral carpal tunnel syndrome.  I discussed with him that I will refer him to orthopedic surgery and they are to discuss further management and treatment.

## 2022-10-05 NOTE — PROGRESS NOTES
"Chief Complaint  Numbness (Numbness tingling bilateral hands with two locking fingers on L. Hand and one locking finger on right hand)    Subjective          Sherly Jo is a 57 y.o. male who presents to [facility]  History of Present Illness  57-year-old man evaluated for bilateral hand numbness and tingling in left index finger trigger finger.  He states that this been ongoing for several months.  It gets numb and tingly when he is driving.  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  He states that it happens during the night as well that his hands gets numb and tingling several times a night and sometimes he cannot his hand as well.  It feels like it swollen but it is not.  He has the left trigger finger as well and he gets stuck that he has to mechanically pry it open.    He has worn wrist splints that does not help him.  Objective   Vital Signs:   /74 (BP Location: Left arm, Patient Position: Sitting, Cuff Size: Adult)   Pulse 71   Ht 185.4 cm (73\")   Wt 118 kg (261 lb)   BMI 34.43 kg/m²     Physical Exam   There is no weakness of the upper extremity on individual muscle testing.  Phalen sign is negative.  Pressure to the wrist does not produce any numbness and tingling.  There is left index trigger finger.            Assessment and Plan  Diagnoses and all orders for this visit:    1. Bilateral carpal tunnel syndrome (Primary)  Assessment & Plan:  Nerve conduction study is abnormal shows electrophysiologic evidence for moderate to severe bilateral carpal tunnel syndrome.  I discussed with him that I will refer him to orthopedic surgery and they are to discuss further management and treatment.    Orders:  -     Ambulatory Referral to Orthopedic Surgery    2. Acquired trigger finger of left index finger  Assessment & Plan:  He will be evaluated and treated by orthopedic surgery    Orders:  -     Ambulatory Referral to " Orthopedic Surgery       Nerve Conduction Study:  7 nerves     EMG:  Not done    Total time spent with the patient and coordinating patient care was 15 minutes.    Follow Up  No follow-ups on file.  Patient was given instructions and counseling regarding his condition or for health maintenance advice. Please see specific information pulled into the AVS if appropriate.

## 2022-10-10 DIAGNOSIS — R20.0 NUMBNESS AND TINGLING IN BOTH HANDS: ICD-10-CM

## 2022-10-10 DIAGNOSIS — R20.2 NUMBNESS AND TINGLING IN BOTH HANDS: ICD-10-CM

## 2022-10-28 ENCOUNTER — OFFICE VISIT (OUTPATIENT)
Dept: ORTHOPEDIC SURGERY | Facility: CLINIC | Age: 57
End: 2022-10-28

## 2022-10-28 ENCOUNTER — PREP FOR SURGERY (OUTPATIENT)
Dept: OTHER | Facility: HOSPITAL | Age: 57
End: 2022-10-28

## 2022-10-28 VITALS — HEART RATE: 68 BPM | BODY MASS INDEX: 34.59 KG/M2 | HEIGHT: 73 IN | OXYGEN SATURATION: 99 % | WEIGHT: 261 LBS

## 2022-10-28 DIAGNOSIS — G56.02 LEFT CARPAL TUNNEL SYNDROME: Primary | ICD-10-CM

## 2022-10-28 DIAGNOSIS — G56.01 RIGHT CARPAL TUNNEL SYNDROME: ICD-10-CM

## 2022-10-28 PROCEDURE — 99203 OFFICE O/P NEW LOW 30 MIN: CPT | Performed by: ORTHOPAEDIC SURGERY

## 2022-10-28 RX ORDER — CEFAZOLIN SODIUM 2 G/100ML
2 INJECTION, SOLUTION INTRAVENOUS ONCE
Status: CANCELLED | OUTPATIENT
Start: 2022-10-28 | End: 2022-10-28

## 2022-10-28 RX ORDER — CEFAZOLIN SODIUM IN 0.9 % NACL 3 G/100 ML
3 INTRAVENOUS SOLUTION, PIGGYBACK (ML) INTRAVENOUS ONCE
Status: CANCELLED | OUTPATIENT
Start: 2022-10-28 | End: 2022-10-28

## 2022-10-28 NOTE — PROGRESS NOTES
"Chief Complaint  Initial Evaluation of the Left Hand and Initial Evaluation of the Right Hand     Subjective      Sherly Jo presents to [facility] for bilateral hand. Patient has had symptoms over the last few months. Patient has bilateral numbness and tingling.  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     Allergies   Allergen Reactions   • Codeine Anaphylaxis        Social History     Socioeconomic History   • Marital status:    Tobacco Use   • Smoking status: Never   • Smokeless tobacco: Former     Types: Chew   Vaping Use   • Vaping Use: Never used   Substance and Sexual Activity   • Alcohol use: Yes     Comment: CURRENT SOME DAY   • Drug use: Never   • Sexual activity: Defer        Review of Systems     Objective   Vital Signs:   Pulse 68   Ht 185.4 cm (73\")   Wt 118 kg (261 lb)   SpO2 99%   BMI 34.43 kg/m²       Physical Exam  Constitutional:       Appearance: Normal appearance. Patient is well-developed and normal weight.   HENT:      Head: Normocephalic.      Right Ear: Hearing and external ear normal.      Left Ear: Hearing and external ear normal.      Nose: Nose normal.   Eyes:      Conjunctiva/sclera: Conjunctivae normal.   Cardiovascular:      Rate and Rhythm: Normal rate.   Pulmonary:      Effort: Pulmonary effort is normal.      Breath sounds: No wheezing or rales.   Abdominal:      Palpations: Abdomen is soft.      Tenderness: There is no abdominal tenderness.   Musculoskeletal:      Cervical back: Normal range of motion.   Skin:     Findings: No rash.   Neurological:      Mental Status: Patient  is alert and oriented to person, place, and time.   Psychiatric:         Mood and Affect: Mood and affect normal.         Judgment: Judgment normal.       Ortho Exam      RIGHT HAND radial pulse 2+. Ulnar pulse 2+. Sensation intact. Neurovascular Intact. Good tone of deltoid, bicep, triceps, wrist extensors and flexors. " Index finger only for swelling and locking. Positive tinel's.  No muscle atrophy.  Median nerve compression positive.         LEFT HAND Good tone of deltoid, bicep, triceps, wrist extensors and flexors. Sensation intact. Neurovascular Intact. Radial pulse 2+. Ulnar pulse 2+. Index and middle finger lock and swell.   Positive tinel's.  No muscle atrophy.  Median nerve compression positive.     Procedures        Imaging Results (Most Recent)     None           Result Review :               Assessment and Plan     Diagnoses and all orders for this visit:    1. Left carpal tunnel syndrome (Primary)    2. Right carpal tunnel syndrome        Limit repetitive gripping after surgery.  Patient discussing one surgery at a time. Patient wants to proceed with left hand. Will schedule right hand at a later time.  Patient will proceed with left carpal tunnel release.     Discussed surgery., Risks/benefits discussed with patient including, but not limited to: infection, bleeding, neurovascular damage, re-rupture, aesthetic deformity, need for further surgery, and death. and Surgery pamphlet given.    Follow Up     Post Operatively.       Patient was given instructions and counseling regarding his condition or for health maintenance advice. Please see specific information pulled into the AVS if appropriate.     Scribed for Jonnathan Alegria MD by Caty Arriola MA.  10/28/22   13:14 EDT    I have personally performed the services described in this document as scribed by the above individual and it is both accurate and complete. Jonnathan Alegria MD 10/28/22

## 2022-11-01 NOTE — PRE-PROCEDURE INSTRUCTIONS
PRE-OP INSTRUCTIONS REVIEWED WITH PATIENT: FASTING/BATHING/ARRIVAL PROCEDURES.  INSTRUCTED TO TAKE A.M. DAY OF SURGERY: TYLENOL PRN  UNDERSTANDING VERBALIZED.

## 2022-11-03 ENCOUNTER — HOSPITAL ENCOUNTER (OUTPATIENT)
Facility: HOSPITAL | Age: 57
Discharge: HOME | End: 2022-11-03
Attending: ORTHOPAEDIC SURGERY | Admitting: ORTHOPAEDIC SURGERY

## 2022-11-03 ENCOUNTER — ANESTHESIA (OUTPATIENT)
Dept: PERIOP | Facility: HOSPITAL | Age: 57
End: 2022-11-03

## 2022-11-03 ENCOUNTER — ANESTHESIA EVENT (OUTPATIENT)
Dept: PERIOP | Facility: HOSPITAL | Age: 57
End: 2022-11-03

## 2022-11-03 ENCOUNTER — TELEPHONE (OUTPATIENT)
Dept: ORTHOPEDIC SURGERY | Facility: CLINIC | Age: 57
End: 2022-11-03

## 2022-11-03 VITALS
HEART RATE: 64 BPM | OXYGEN SATURATION: 90 % | SYSTOLIC BLOOD PRESSURE: 123 MMHG | BODY MASS INDEX: 34.33 KG/M2 | DIASTOLIC BLOOD PRESSURE: 78 MMHG | HEIGHT: 73 IN | WEIGHT: 259.04 LBS | TEMPERATURE: 97.6 F | RESPIRATION RATE: 11 BRPM

## 2022-11-03 DIAGNOSIS — G56.02 CARPAL TUNNEL SYNDROME OF LEFT WRIST: Primary | ICD-10-CM

## 2022-11-03 DIAGNOSIS — G56.02 LEFT CARPAL TUNNEL SYNDROME: ICD-10-CM

## 2022-11-03 PROCEDURE — 25010000002 FENTANYL CITRATE (PF) 50 MCG/ML SOLUTION: Performed by: NURSE ANESTHETIST, CERTIFIED REGISTERED

## 2022-11-03 PROCEDURE — 25010000002 CEFAZOLIN IN DEXTROSE 2-4 GM/100ML-% SOLUTION: Performed by: ORTHOPAEDIC SURGERY

## 2022-11-03 PROCEDURE — 25010000002 ONDANSETRON PER 1 MG: Performed by: NURSE ANESTHETIST, CERTIFIED REGISTERED

## 2022-11-03 PROCEDURE — 64721 CARPAL TUNNEL SURGERY: CPT | Performed by: ORTHOPAEDIC SURGERY

## 2022-11-03 PROCEDURE — 25010000002 DEXAMETHASONE PER 1 MG: Performed by: NURSE ANESTHETIST, CERTIFIED REGISTERED

## 2022-11-03 PROCEDURE — S0260 H&P FOR SURGERY: HCPCS | Performed by: ORTHOPAEDIC SURGERY

## 2022-11-03 PROCEDURE — 25010000002 MIDAZOLAM PER 1 MG: Performed by: ANESTHESIOLOGY

## 2022-11-03 PROCEDURE — 25010000002 PROPOFOL 10 MG/ML EMULSION: Performed by: NURSE ANESTHETIST, CERTIFIED REGISTERED

## 2022-11-03 PROCEDURE — 26055 INCISE FINGER TENDON SHEATH: CPT | Performed by: ORTHOPAEDIC SURGERY

## 2022-11-03 RX ORDER — PROMETHAZINE HYDROCHLORIDE 12.5 MG/1
25 TABLET ORAL ONCE AS NEEDED
Status: DISCONTINUED | OUTPATIENT
Start: 2022-11-03 | End: 2022-11-03 | Stop reason: HOSPADM

## 2022-11-03 RX ORDER — HYDROCODONE BITARTRATE AND ACETAMINOPHEN 7.5; 325 MG/1; MG/1
1 TABLET ORAL EVERY 4 HOURS PRN
Qty: 45 TABLET | Refills: 0 | Status: SHIPPED | OUTPATIENT
Start: 2022-11-03 | End: 2022-12-28

## 2022-11-03 RX ORDER — DEXAMETHASONE SODIUM PHOSPHATE 4 MG/ML
INJECTION, SOLUTION INTRA-ARTICULAR; INTRALESIONAL; INTRAMUSCULAR; INTRAVENOUS; SOFT TISSUE AS NEEDED
Status: DISCONTINUED | OUTPATIENT
Start: 2022-11-03 | End: 2022-11-03 | Stop reason: SURG

## 2022-11-03 RX ORDER — OXYCODONE HYDROCHLORIDE 5 MG/1
5 TABLET ORAL
Status: DISCONTINUED | OUTPATIENT
Start: 2022-11-03 | End: 2022-11-03

## 2022-11-03 RX ORDER — ACETAMINOPHEN 500 MG
1000 TABLET ORAL ONCE
Status: COMPLETED | OUTPATIENT
Start: 2022-11-03 | End: 2022-11-03

## 2022-11-03 RX ORDER — PROPOFOL 10 MG/ML
VIAL (ML) INTRAVENOUS AS NEEDED
Status: DISCONTINUED | OUTPATIENT
Start: 2022-11-03 | End: 2022-11-03 | Stop reason: SURG

## 2022-11-03 RX ORDER — PROMETHAZINE HYDROCHLORIDE 25 MG/1
25 SUPPOSITORY RECTAL ONCE AS NEEDED
Status: DISCONTINUED | OUTPATIENT
Start: 2022-11-03 | End: 2022-11-03 | Stop reason: HOSPADM

## 2022-11-03 RX ORDER — CEFAZOLIN SODIUM IN 0.9 % NACL 3 G/100 ML
3 INTRAVENOUS SOLUTION, PIGGYBACK (ML) INTRAVENOUS ONCE
Status: DISCONTINUED | OUTPATIENT
Start: 2022-11-03 | End: 2022-11-03 | Stop reason: SDUPTHER

## 2022-11-03 RX ORDER — ONDANSETRON 2 MG/ML
4 INJECTION INTRAMUSCULAR; INTRAVENOUS ONCE AS NEEDED
Status: DISCONTINUED | OUTPATIENT
Start: 2022-11-03 | End: 2022-11-03 | Stop reason: HOSPADM

## 2022-11-03 RX ORDER — CEFAZOLIN SODIUM 2 G/100ML
2 INJECTION, SOLUTION INTRAVENOUS ONCE
Status: COMPLETED | OUTPATIENT
Start: 2022-11-03 | End: 2022-11-03

## 2022-11-03 RX ORDER — FENTANYL CITRATE 50 UG/ML
INJECTION, SOLUTION INTRAMUSCULAR; INTRAVENOUS AS NEEDED
Status: DISCONTINUED | OUTPATIENT
Start: 2022-11-03 | End: 2022-11-03 | Stop reason: SURG

## 2022-11-03 RX ORDER — MEPERIDINE HYDROCHLORIDE 25 MG/ML
12.5 INJECTION INTRAMUSCULAR; INTRAVENOUS; SUBCUTANEOUS
Status: DISCONTINUED | OUTPATIENT
Start: 2022-11-03 | End: 2022-11-03 | Stop reason: HOSPADM

## 2022-11-03 RX ORDER — LIDOCAINE HYDROCHLORIDE 20 MG/ML
INJECTION, SOLUTION EPIDURAL; INFILTRATION; INTRACAUDAL; PERINEURAL AS NEEDED
Status: DISCONTINUED | OUTPATIENT
Start: 2022-11-03 | End: 2022-11-03 | Stop reason: SURG

## 2022-11-03 RX ORDER — MIDAZOLAM HYDROCHLORIDE 1 MG/ML
2 INJECTION INTRAMUSCULAR; INTRAVENOUS ONCE
Status: COMPLETED | OUTPATIENT
Start: 2022-11-03 | End: 2022-11-03

## 2022-11-03 RX ORDER — OXYCODONE HYDROCHLORIDE 5 MG/1
5 TABLET ORAL
Status: COMPLETED | OUTPATIENT
Start: 2022-11-03 | End: 2022-11-03

## 2022-11-03 RX ORDER — GLYCOPYRROLATE 0.2 MG/ML
0.2 INJECTION INTRAMUSCULAR; INTRAVENOUS
Status: COMPLETED | OUTPATIENT
Start: 2022-11-03 | End: 2022-11-03

## 2022-11-03 RX ORDER — SODIUM CHLORIDE, SODIUM LACTATE, POTASSIUM CHLORIDE, CALCIUM CHLORIDE 600; 310; 30; 20 MG/100ML; MG/100ML; MG/100ML; MG/100ML
9 INJECTION, SOLUTION INTRAVENOUS CONTINUOUS PRN
Status: DISCONTINUED | OUTPATIENT
Start: 2022-11-03 | End: 2022-11-03 | Stop reason: HOSPADM

## 2022-11-03 RX ORDER — ONDANSETRON 2 MG/ML
INJECTION INTRAMUSCULAR; INTRAVENOUS AS NEEDED
Status: DISCONTINUED | OUTPATIENT
Start: 2022-11-03 | End: 2022-11-03 | Stop reason: SURG

## 2022-11-03 RX ORDER — BUPIVACAINE HYDROCHLORIDE 5 MG/ML
INJECTION, SOLUTION EPIDURAL; INTRACAUDAL AS NEEDED
Status: DISCONTINUED | OUTPATIENT
Start: 2022-11-03 | End: 2022-11-03 | Stop reason: HOSPADM

## 2022-11-03 RX ADMIN — OXYCODONE HYDROCHLORIDE 5 MG: 5 TABLET ORAL at 08:42

## 2022-11-03 RX ADMIN — LIDOCAINE HYDROCHLORIDE 100 MG: 20 INJECTION, SOLUTION EPIDURAL; INFILTRATION; INTRACAUDAL; PERINEURAL at 07:32

## 2022-11-03 RX ADMIN — PROPOFOL 250 MCG/KG/MIN: 10 INJECTION, EMULSION INTRAVENOUS at 07:32

## 2022-11-03 RX ADMIN — ONDANSETRON 4 MG: 2 INJECTION INTRAMUSCULAR; INTRAVENOUS at 08:32

## 2022-11-03 RX ADMIN — PROPOFOL 50 MG: 10 INJECTION, EMULSION INTRAVENOUS at 07:54

## 2022-11-03 RX ADMIN — OXYCODONE HYDROCHLORIDE 5 MG: 5 TABLET ORAL at 08:57

## 2022-11-03 RX ADMIN — ACETAMINOPHEN 1000 MG: 500 TABLET ORAL at 06:34

## 2022-11-03 RX ADMIN — FENTANYL CITRATE 25 MCG: 50 INJECTION, SOLUTION INTRAMUSCULAR; INTRAVENOUS at 07:40

## 2022-11-03 RX ADMIN — FENTANYL CITRATE 75 MCG: 50 INJECTION, SOLUTION INTRAMUSCULAR; INTRAVENOUS at 07:49

## 2022-11-03 RX ADMIN — MIDAZOLAM 2 MG: 1 INJECTION INTRAMUSCULAR; INTRAVENOUS at 07:26

## 2022-11-03 RX ADMIN — CEFAZOLIN SODIUM 2 G: 2 INJECTION, SOLUTION INTRAVENOUS at 07:32

## 2022-11-03 RX ADMIN — DEXAMETHASONE SODIUM PHOSPHATE 4 MG: 4 INJECTION, SOLUTION INTRA-ARTICULAR; INTRALESIONAL; INTRAMUSCULAR; INTRAVENOUS; SOFT TISSUE at 07:40

## 2022-11-03 RX ADMIN — PROPOFOL 50 MG: 10 INJECTION, EMULSION INTRAVENOUS at 07:47

## 2022-11-03 RX ADMIN — SODIUM CHLORIDE, POTASSIUM CHLORIDE, SODIUM LACTATE AND CALCIUM CHLORIDE 9 ML/HR: 600; 310; 30; 20 INJECTION, SOLUTION INTRAVENOUS at 06:34

## 2022-11-03 RX ADMIN — ONDANSETRON 4 MG: 2 INJECTION INTRAMUSCULAR; INTRAVENOUS at 07:40

## 2022-11-03 RX ADMIN — GLYCOPYRROLATE 0.2 MG: 0.2 INJECTION INTRAMUSCULAR; INTRAVENOUS at 07:26

## 2022-11-03 NOTE — ANESTHESIA PREPROCEDURE EVALUATION
Anesthesia Evaluation     Patient summary reviewed and Nursing notes reviewed   no history of anesthetic complications:  NPO Solid Status: > 8 hours  NPO Liquid Status: > 2 hours           Airway   Mallampati: I  TM distance: >3 FB  Neck ROM: full  No difficulty expected  Dental - normal exam     Pulmonary - normal exam    breath sounds clear to auscultation  (+) sleep apnea on CPAP,   Cardiovascular - negative cardio ROS and normal exam  Exercise tolerance: good (4-7 METS)    Rhythm: regular  Rate: normal        Neuro/Psych  (+) numbness, psychiatric history Anxiety and Depression,    GI/Hepatic/Renal/Endo    (+)  GERD,  diabetes mellitus (Pre-diabetic),     Musculoskeletal     Abdominal    Substance History - negative use     OB/GYN negative ob/gyn ROS         Other   arthritis,      ROS/Med Hx Other: PAT Nursing Notes unavailable.                   Anesthesia Plan    ASA 2     MAC and general     (Pt prefers MAC)  intravenous induction     Anesthetic plan, risks, benefits, and alternatives have been provided, discussed and informed consent has been obtained with: patient.  Pre-procedure education provided  Plan discussed with CRNA.        CODE STATUS:

## 2022-11-03 NOTE — TELEPHONE ENCOUNTER
PATIENT CALLED WITH CONCERNS OF NUMBNESS IN THE RING AND MIDDLE FINGERS OF THE LEFT HAND FOLLOWING CARPAL TUNNEL RELEASE AND TRIGGER FINGER RELEASE. I ADVISED THE PATIENT THAT AT THIS TIME IT IS NORMAL TO STILL HAVE NUMBNESS AND IT SHOULD RESOLVE WITHIN 24-48 HOURS. ALSO ADVISED PATIENT TO CONTINUE GENTLE ROM AND BENDING OF THE FINGERS TO HELP INCREASE BLOOD FLOW.   PATIENT VOICED UNDERSTANDING AND WILL CALL WITH ANY ADDITIONAL CONCERNS.

## 2022-11-03 NOTE — ANESTHESIA POSTPROCEDURE EVALUATION
Patient: Sherly Jo    Procedure Summary     Date: 11/03/22 Room / Location: [facility] OR  / [facility]    Anesthesia Start: 0731 Anesthesia Stop: 0815    Procedure: LEFT CARPAL TUNNEL RELEASE, AND LEFT INDEX MIDDLE FINGER AND TRIGGER FINGER RELEASE (Left: Wrist) Diagnosis:       Left carpal tunnel syndrome      (Left carpal tunnel syndrome [G56.02])    Surgeons: Jonnathan Alegria MD Provider: Reyes, Mirabelle, DO    Anesthesia Type: MAC, general ASA Status: 2          Anesthesia Type: MAC, general    Vitals  Vitals Value Taken Time   /78 11/03/22 0840   Temp 36.4 °C (97.6 °F) 11/03/22 0812   Pulse 61 11/03/22 0841   Resp 11 11/03/22 0812   SpO2 97 % 11/03/22 0841   Vitals shown include unvalidated device data.        Post Anesthesia Care and Evaluation    Patient location during evaluation: bedside  Patient participation: complete - patient participated  Level of consciousness: awake  Pain management: adequate    Airway patency: patent  Anesthetic complications: No anesthetic complications  PONV Status: none  Cardiovascular status: acceptable and stable  Respiratory status: acceptable  Hydration status: acceptable    Comments: An Anesthesiologist personally participated in the most demanding procedures (including induction and emergence if applicable) in the anesthesia plan, monitored the course of anesthesia administration at frequent intervals and remained physically present and available for immediate diagnosis and treatment of emergencies.

## 2022-11-03 NOTE — H&P
"h and p      Chief Complaint  Initial Evaluation of the Left Hand and Initial Evaluation of the Right Hand        Subjective          Sherly Jo presents to [facility] for bilateral hand. Patient has had symptoms over the last few months. Patient has bilateral numbness and tingling.  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           Allergies   Allergen Reactions   • Codeine Anaphylaxis         Social History            Socioeconomic History   • Marital status:    Tobacco Use   • Smoking status: Never   • Smokeless tobacco: Former       Types: Chew   Vaping Use   • Vaping Use: Never used   Substance and Sexual Activity   • Alcohol use: Yes       Comment: CURRENT SOME DAY   • Drug use: Never   • Sexual activity: Defer         Review of Systems            Objective      Vital Signs:   Pulse 68   Ht 185.4 cm (73\")   Wt 118 kg (261 lb)   SpO2 99%   BMI 34.43 kg/m²        Physical Exam  Constitutional:       Appearance: Normal appearance. Patient is well-developed and normal weight.   HENT:      Head: Normocephalic.      Right Ear: Hearing and external ear normal.      Left Ear: Hearing and external ear normal.      Nose: Nose normal.   Eyes:      Conjunctiva/sclera: Conjunctivae normal.   Cardiovascular:      Rate and Rhythm: Normal rate.   Pulmonary:      Effort: Pulmonary effort is normal.      Breath sounds: No wheezing or rales.   Abdominal:      Palpations: Abdomen is soft.      Tenderness: There is no abdominal tenderness.   Musculoskeletal:      Cervical back: Normal range of motion.   Skin:     Findings: No rash.   Neurological:      Mental Status: Patient  is alert and oriented to person, place, and time.   Psychiatric:         Mood and Affect: Mood and affect normal.         Judgment: Judgment normal.         Ortho Exam       RIGHT HAND radial pulse 2+. Ulnar pulse 2+. Sensation intact. Neurovascular Intact. Good tone of " deltoid, bicep, triceps, wrist extensors and flexors. Index finger only for swelling and locking. Positive tinel's.  No muscle atrophy.  Median nerve compression positive.            LEFT HAND Good tone of deltoid, bicep, triceps, wrist extensors and flexors. Sensation intact. Neurovascular Intact. Radial pulse 2+. Ulnar pulse 2+. Index and middle finger lock and swell.   Positive tinel's.  No muscle atrophy.  Median nerve compression positive.      Procedures               Imaging Results (Most Recent)      None                   Result Review    :                     Assessment      Assessment and Plan      Diagnoses and all orders for this visit:     1. Left carpal tunnel syndrome (Primary)     2. Right carpal tunnel syndrome           Limit repetitive gripping after surgery.  Patient discussing one surgery at a time. Patient wants to proceed with left hand. Will schedule right hand at a later time.  Patient will proceed with left carpal tunnel release.      Discussed surgery., Risks/benefits discussed with patient including, but not limited to: infection, bleeding, neurovascular damage, re-rupture, aesthetic deformity, need for further surgery, and death. and Surgery pamphlet given.     Follow Up      Post Operatively.         Jonnathan Alegria MD  11/03/22

## 2022-11-04 NOTE — OP NOTE
CARPAL TUNNEL RELEASE  Procedure Report    Patient Name:  Sherly Jo  YOB: 1965    Date of Surgery:  11/3/2022       Pre-op Diagnosis:   Left carpal tunnel syndrome [G56.02]   Left second and third trigger finger       Post-Op Diagnosis Codes:     * Left carpal tunnel syndrome [G56.02]   Same    Procedure/CPT® Codes:      Procedure(s):  LEFT CARPAL TUNNEL RELEASE, AND LEFT INDEX MIDDLE FINGER AND TRIGGER FINGER RELEASE    Staff:  Surgeon(s):  Jonnathan Alegria MD    Assistant: Matty Zepeda    Anesthesia: Choice    Estimated Blood Loss: 1 mL    Implants:    Nothing was implanted during the procedure    Specimen:          None      Complications: None    Description of Procedure:   :   The patient was informed of the risks and benefits.  The patient was taken to the operating room and placed supine after a [place] block anesthesia was established.  The left hand and upper extremity were prepped and draped satisfactorily using alcohol and ChloraPrep.      A standard incision was made just ulnar to the thenar crease to the thumb, approximately 1.5 cm length, carried down through subcutaneous tissue and fascia, carried down to the palmaris fascia with tenotomy scissors.  Using a 69 blade the transverse carpal ligament was released over the median nerve.  The median nerve had significant signs of compression.  A [place] elevator was used to protect the nerves during the release and after release had been completed, it was checked proximally and distally and under loupe magnification was satisfactory.  The nerve was inspected and was intact along with the motor branch.  The wound was copiously irrigated.  The skin was closed with horizontal mattress suture nylon and interrupted 3-0 horizontal mattress sutures.  2 incisions were made over the A1 pulley of the second and third metacarpals.  Dissection was carried down to the A1 pulley and the A1 pulley was released thoroughly and the FDP and FDS of each  finger respectively were pulled free of the pulley.  There is no further triggering in either finger and the incisions were  washed and dried and sterile dressings were applied.       Jonnathan Alegria MD     Date: 11/4/2022  Time: 12:55 EDT

## 2022-11-11 ENCOUNTER — TELEPHONE (OUTPATIENT)
Dept: ORTHOPEDIC SURGERY | Facility: CLINIC | Age: 57
End: 2022-11-11

## 2022-11-11 NOTE — TELEPHONE ENCOUNTER
Patient came into office wanting to return to work before his 2 week post op appointment. 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 I spoke to  He approved and the patient was advised on risk factors if he returns. Patient was given work not that stated no use of left hand.

## 2022-11-11 NOTE — TELEPHONE ENCOUNTER
PT WANTS TO SCHEDULE SURGERY WITH DR. YORK REGARDING HIS RT HAND. PLEASE ADVISE AND CALL PT [phone].

## 2022-11-16 NOTE — TELEPHONE ENCOUNTER
SPOKE WITH PATIENT REGARDING SCHEDULING FOR RIGHT CTR, PATIENT IS GOING TO DISCUSS THIS WITH DR. YORK WHEN HE FOLLOWS-UP ON HIS LEFT CTR ON Friday 11/18/2022 AND WE CAN GET HIM SCHEDULED AT THAT TIME ONCE DR. YORK PUTS A CASE REQUEST IN. PATIENT VOICED UNDERSTANDING.

## 2022-11-18 ENCOUNTER — PREP FOR SURGERY (OUTPATIENT)
Dept: OTHER | Facility: HOSPITAL | Age: 57
End: 2022-11-18

## 2022-11-18 ENCOUNTER — OFFICE VISIT (OUTPATIENT)
Dept: ORTHOPEDIC SURGERY | Facility: CLINIC | Age: 57
End: 2022-11-18

## 2022-11-18 VITALS — HEIGHT: 73 IN | BODY MASS INDEX: 34.33 KG/M2 | WEIGHT: 259 LBS

## 2022-11-18 DIAGNOSIS — G56.01 RIGHT CARPAL TUNNEL SYNDROME: Primary | ICD-10-CM

## 2022-11-18 DIAGNOSIS — M65.321 TRIGGER INDEX FINGER OF RIGHT HAND: ICD-10-CM

## 2022-11-18 PROCEDURE — 99214 OFFICE O/P EST MOD 30 MIN: CPT | Performed by: ORTHOPAEDIC SURGERY

## 2022-11-18 RX ORDER — CEFAZOLIN SODIUM 2 G/100ML
2 INJECTION, SOLUTION INTRAVENOUS ONCE
Status: CANCELLED | OUTPATIENT
Start: 2022-11-18 | End: 2022-11-18

## 2022-11-18 RX ORDER — CEFAZOLIN SODIUM IN 0.9 % NACL 3 G/100 ML
3 INTRAVENOUS SOLUTION, PIGGYBACK (ML) INTRAVENOUS ONCE
Status: CANCELLED | OUTPATIENT
Start: 2022-11-18 | End: 2022-11-18

## 2022-11-18 NOTE — PROGRESS NOTES
"Chief Complaint  Follow-up of the Left Hand     Subjective      Sherly Jo presents to [facility] for follow up of the left hand . He is s/p  Left carpal tunnel release and left index and middle finger trigger finger release on 11/3/22.  He states he hears popping of 2nd digit at times.  He is pleased with the surgery and numbness and tingling is gone.      Allergies   Allergen Reactions   • Codeine Anaphylaxis        Social History     Socioeconomic History   • Marital status:    Tobacco Use   • Smoking status: Never   • Smokeless tobacco: Former     Types: Chew   Vaping Use   • Vaping Use: Never used   Substance and Sexual Activity   • Alcohol use: Yes     Comment: CURRENT SOME DAY   • Drug use: Never   • Sexual activity: Defer        Review of Systems     Objective   Vital Signs:   Ht 185.4 cm (73\")   Wt 117 kg (259 lb)   BMI 34.17 kg/m²       Physical Exam  Constitutional:       Appearance: Normal appearance. Patient is well-developed and normal weight.   HENT:      Head: Normocephalic.      Right Ear: Hearing and external ear normal.      Left Ear: Hearing and external ear normal.      Nose: Nose normal.   Eyes:      Conjunctiva/sclera: Conjunctivae normal.   Cardiovascular:      Rate and Rhythm: Normal rate.   Pulmonary:      Effort: Pulmonary effort is normal.      Breath sounds: No wheezing or rales.   Abdominal:      Palpations: Abdomen is soft.      Tenderness: There is no abdominal tenderness.   Musculoskeletal:      Cervical back: Normal range of motion.   Skin:     Findings: No rash.   Neurological:      Mental Status: Patient  is alert and oriented to person, place, and time.   Psychiatric:         Mood and Affect: Mood and affect normal.         Judgment: Judgment normal.       Ortho Exam      LEFT HAND radial pulse 2+. Ulnar pulse 2+. Good tone of deltoid, bicep, triceps, wrist extensors and flexors. No evidence of wound dehiscence, surrounding " erythema, warmth, or drainage. Notes popping of the 2 nd digit.  Full , thumb opposition, MCP flexors, DIP flexors and PIP flexors.     Procedures        Imaging Results (Most Recent)     None           Result Review :               Assessment and Plan     Diagnoses and all orders for this visit:    1. Right carpal tunnel syndrome (Primary)    2. Trigger index finger of right hand        Discussed the treatment plan with the patient.The patient is ready to get scheduled for carpal tunnel of right wrist.     Educated on risk of smoking. Discussed options for smoking cessation.  Discussed surgery., Risks/benefits discussed with patient including, but not limited to: infection, bleeding, neurovascular damage, re-rupture, aesthetic deformity, need for further surgery, and death. and Surgery pamphlet given.    Follow Up     Postoperatively     Patient was given instructions and counseling regarding his condition or for health maintenance advice. Please see specific information pulled into the AVS if appropriate.     Scribed for Jonnathan Alegria MD by Caty Arriola MA.  11/18/22   13:51 EST    I have personally performed the services described in this document as scribed by the above individual and it is both accurate and complete. Jonnathan Alegria MD 11/21/22

## 2022-11-30 ENCOUNTER — TELEPHONE (OUTPATIENT)
Dept: ORTHOPEDIC SURGERY | Facility: CLINIC | Age: 57
End: 2022-11-30

## 2022-11-30 NOTE — TELEPHONE ENCOUNTER
Dr. Alegria called and spoke with patient on the phone.  Patient is going to come in and see Dr. Alegria the Wednesday before Christmas.

## 2022-11-30 NOTE — TELEPHONE ENCOUNTER
Caller: JOHN     Relationship to patient: SELF     Best call back number: [phone]     Type of visit: POST OP     Additional notes:PATIENT STATES THAT HE IS HAVING PROBLEMS WITH THE FINGER THAT THE SX WAS ON POPPING WOULD LIKE TO SEE BEEN ASAP

## 2022-12-21 ENCOUNTER — PREP FOR SURGERY (OUTPATIENT)
Dept: OTHER | Facility: HOSPITAL | Age: 57
End: 2022-12-21

## 2022-12-21 ENCOUNTER — OFFICE VISIT (OUTPATIENT)
Dept: ORTHOPEDIC SURGERY | Facility: CLINIC | Age: 57
End: 2022-12-21

## 2022-12-21 VITALS — OXYGEN SATURATION: 97 % | HEIGHT: 73 IN | WEIGHT: 259 LBS | BODY MASS INDEX: 34.33 KG/M2 | HEART RATE: 74 BPM

## 2022-12-21 DIAGNOSIS — G56.01 RIGHT CARPAL TUNNEL SYNDROME: Primary | ICD-10-CM

## 2022-12-21 DIAGNOSIS — G56.02 LEFT CARPAL TUNNEL SYNDROME: ICD-10-CM

## 2022-12-21 DIAGNOSIS — M65.321 TRIGGER INDEX FINGER OF RIGHT HAND: ICD-10-CM

## 2022-12-21 DIAGNOSIS — M65.322 TRIGGER INDEX FINGER OF LEFT HAND: ICD-10-CM

## 2022-12-21 PROCEDURE — 99024 POSTOP FOLLOW-UP VISIT: CPT | Performed by: ORTHOPAEDIC SURGERY

## 2022-12-21 NOTE — PROGRESS NOTES
"Chief Complaint  Follow-up of the Left Hand     Subjective      Sherly Jo presents to [facility] for follow up of the left hand. He had on 11/18/22 a trigger finger on the left hand 2 nd digit and a left carpal tunnel release.  He still having popping of catching of the left hand. He has surgery scheduled to the right wrist carpal tunnel and right 2 nd digit.  Due to the pooping he will also have a left trigger finger on the 2 nd digit.  He is having no problems with the left wrist.     Allergies   Allergen Reactions   • Codeine Anaphylaxis        Social History     Socioeconomic History   • Marital status:    Tobacco Use   • Smoking status: Never   • Smokeless tobacco: Former     Types: Chew   Vaping Use   • Vaping Use: Never used   Substance and Sexual Activity   • Alcohol use: Yes     Comment: CURRENT SOME DAY   • Drug use: Never   • Sexual activity: Defer        Review of Systems     Objective   Vital Signs:   Pulse 74   Ht 185.4 cm (73\")   Wt 117 kg (259 lb)   SpO2 97%   BMI 34.17 kg/m²       Physical Exam  Constitutional:       Appearance: Normal appearance. Patient is well-developed and normal weight.   HENT:      Head: Normocephalic.      Right Ear: Hearing and external ear normal.      Left Ear: Hearing and external ear normal.      Nose: Nose normal.   Eyes:      Conjunctiva/sclera: Conjunctivae normal.   Cardiovascular:      Rate and Rhythm: Normal rate.   Pulmonary:      Effort: Pulmonary effort is normal.      Breath sounds: No wheezing or rales.   Abdominal:      Palpations: Abdomen is soft.      Tenderness: There is no abdominal tenderness.   Musculoskeletal:      Cervical back: Normal range of motion.   Skin:     Findings: No rash.   Neurological:      Mental Status: Patient  is alert and oriented to person, place, and time.   Psychiatric:         Mood and Affect: Mood and affect normal.         Judgment: Judgment normal.       Ortho Exam      LEFT " HAND Sensation intact. Neurovascular Intact. Full , thumb opposition, MCP flexors, DIP flexors and PIP flexors. No evidence of wound dehiscence, surrounding erythema, warmth, or drainage. Left hand 2 nd digit is still popping and catching and will release that also with the surgery on the 29th.     Procedures        Imaging Results (Most Recent)     None           Result Review :             Assessment and Plan     Diagnoses and all orders for this visit:    1. Right carpal tunnel syndrome (Primary)    2. Trigger index finger of right hand    3. Left carpal tunnel syndrome    4. Trigger index finger of left hand        Discussed the treatment plan with the patient. His incision looks good on the left hand and still having popping and catching of the left hand trigger finger 2nd digit. Next week he is getting a right carpal tunnel and 2nd digit trigger finger release. I will also have a left 2 nd digit release to revise his first A1 pulley surgery.     Call or return if worsening symptoms.    Follow Up     Postoperatively       Patient was given instructions and counseling regarding his condition or for health maintenance advice. Please see specific information pulled into the AVS if appropriate.     Scribed for Jonnathan Alegria MD by Caty Arriola MA.  12/21/22   15:08 EST      I have personally performed the services described in this document as scribed by the above individual and it is both accurate and complete. Jonnathan Alegria MD 12/21/22

## 2022-12-28 NOTE — PRE-PROCEDURE INSTRUCTIONS
PRE-OP INSTRUCTIONS REVIEWED WITH PATIENT: FASTING/BATHING/ARRIVAL PROCEDURES.  INSTRUCTED TO TAKE A.M. DAY OF SURGERY: NONE  UNDERSTANDING VERBALIZED.

## 2022-12-29 ENCOUNTER — HOSPITAL ENCOUNTER (OUTPATIENT)
Facility: HOSPITAL | Age: 57
Discharge: HOME | End: 2022-12-29
Attending: ORTHOPAEDIC SURGERY | Admitting: ORTHOPAEDIC SURGERY
Payer: COMMERCIAL

## 2022-12-29 ENCOUNTER — ANESTHESIA EVENT (OUTPATIENT)
Dept: PERIOP | Facility: HOSPITAL | Age: 57
End: 2022-12-29
Payer: COMMERCIAL

## 2022-12-29 ENCOUNTER — ANESTHESIA (OUTPATIENT)
Dept: PERIOP | Facility: HOSPITAL | Age: 57
End: 2022-12-29
Payer: COMMERCIAL

## 2022-12-29 VITALS
TEMPERATURE: 97.5 F | BODY MASS INDEX: 35.12 KG/M2 | DIASTOLIC BLOOD PRESSURE: 96 MMHG | HEART RATE: 81 BPM | OXYGEN SATURATION: 97 % | RESPIRATION RATE: 22 BRPM | WEIGHT: 264.99 LBS | SYSTOLIC BLOOD PRESSURE: 166 MMHG | HEIGHT: 73 IN

## 2022-12-29 DIAGNOSIS — G56.01 RIGHT CARPAL TUNNEL SYNDROME: ICD-10-CM

## 2022-12-29 DIAGNOSIS — G56.01 CARPAL TUNNEL SYNDROME OF RIGHT WRIST: Primary | ICD-10-CM

## 2022-12-29 PROCEDURE — S0260 H&P FOR SURGERY: HCPCS | Performed by: ORTHOPAEDIC SURGERY

## 2022-12-29 PROCEDURE — 25010000002 KETOROLAC TROMETHAMINE PER 15 MG: Performed by: NURSE ANESTHETIST, CERTIFIED REGISTERED

## 2022-12-29 PROCEDURE — 25010000002 ONDANSETRON PER 1 MG: Performed by: NURSE ANESTHETIST, CERTIFIED REGISTERED

## 2022-12-29 PROCEDURE — 25010000002 DEXAMETHASONE PER 1 MG: Performed by: NURSE ANESTHETIST, CERTIFIED REGISTERED

## 2022-12-29 PROCEDURE — 64721 CARPAL TUNNEL SURGERY: CPT | Performed by: ORTHOPAEDIC SURGERY

## 2022-12-29 PROCEDURE — 20550 NJX 1 TENDON SHEATH/LIGAMENT: CPT | Performed by: ORTHOPAEDIC SURGERY

## 2022-12-29 PROCEDURE — 25010000002 MIDAZOLAM PER 1 MG: Performed by: ANESTHESIOLOGY

## 2022-12-29 PROCEDURE — 25010000002 CEFAZOLIN PER 500 MG: Performed by: ORTHOPAEDIC SURGERY

## 2022-12-29 PROCEDURE — 25010000002 PROPOFOL 10 MG/ML EMULSION: Performed by: NURSE ANESTHETIST, CERTIFIED REGISTERED

## 2022-12-29 PROCEDURE — 25010000002 HYDROMORPHONE 1 MG/ML SOLUTION: Performed by: NURSE ANESTHETIST, CERTIFIED REGISTERED

## 2022-12-29 PROCEDURE — 25010000002 FENTANYL CITRATE (PF) 50 MCG/ML SOLUTION: Performed by: NURSE ANESTHETIST, CERTIFIED REGISTERED

## 2022-12-29 RX ORDER — MEPERIDINE HYDROCHLORIDE 25 MG/ML
12.5 INJECTION INTRAMUSCULAR; INTRAVENOUS; SUBCUTANEOUS
Status: DISCONTINUED | OUTPATIENT
Start: 2022-12-29 | End: 2022-12-29 | Stop reason: HOSPADM

## 2022-12-29 RX ORDER — BUPIVACAINE HYDROCHLORIDE 5 MG/ML
INJECTION, SOLUTION EPIDURAL; INTRACAUDAL AS NEEDED
Status: DISCONTINUED | OUTPATIENT
Start: 2022-12-29 | End: 2022-12-29 | Stop reason: HOSPADM

## 2022-12-29 RX ORDER — SODIUM CHLORIDE, SODIUM LACTATE, POTASSIUM CHLORIDE, CALCIUM CHLORIDE 600; 310; 30; 20 MG/100ML; MG/100ML; MG/100ML; MG/100ML
9 INJECTION, SOLUTION INTRAVENOUS CONTINUOUS PRN
Status: DISCONTINUED | OUTPATIENT
Start: 2022-12-29 | End: 2022-12-29 | Stop reason: HOSPADM

## 2022-12-29 RX ORDER — CEFAZOLIN SODIUM IN 0.9 % NACL 3 G/100 ML
3 INTRAVENOUS SOLUTION, PIGGYBACK (ML) INTRAVENOUS ONCE
Status: COMPLETED | OUTPATIENT
Start: 2022-12-29 | End: 2022-12-29

## 2022-12-29 RX ORDER — KETOROLAC TROMETHAMINE 30 MG/ML
INJECTION, SOLUTION INTRAMUSCULAR; INTRAVENOUS AS NEEDED
Status: DISCONTINUED | OUTPATIENT
Start: 2022-12-29 | End: 2022-12-29 | Stop reason: SURG

## 2022-12-29 RX ORDER — HYDROCODONE BITARTRATE AND ACETAMINOPHEN 7.5; 325 MG/1; MG/1
1 TABLET ORAL EVERY 4 HOURS PRN
Qty: 15 TABLET | Refills: 0 | Status: SHIPPED | OUTPATIENT
Start: 2022-12-29 | End: 2023-01-06

## 2022-12-29 RX ORDER — LIDOCAINE HYDROCHLORIDE 20 MG/ML
INJECTION, SOLUTION EPIDURAL; INFILTRATION; INTRACAUDAL; PERINEURAL AS NEEDED
Status: DISCONTINUED | OUTPATIENT
Start: 2022-12-29 | End: 2022-12-29 | Stop reason: SURG

## 2022-12-29 RX ORDER — OXYCODONE HYDROCHLORIDE 5 MG/1
5 TABLET ORAL
Status: COMPLETED | OUTPATIENT
Start: 2022-12-29 | End: 2022-12-29

## 2022-12-29 RX ORDER — ACETAMINOPHEN 500 MG
1000 TABLET ORAL ONCE
Status: COMPLETED | OUTPATIENT
Start: 2022-12-29 | End: 2022-12-29

## 2022-12-29 RX ORDER — PROMETHAZINE HYDROCHLORIDE 12.5 MG/1
25 TABLET ORAL ONCE AS NEEDED
Status: DISCONTINUED | OUTPATIENT
Start: 2022-12-29 | End: 2022-12-29 | Stop reason: HOSPADM

## 2022-12-29 RX ORDER — PROPOFOL 10 MG/ML
VIAL (ML) INTRAVENOUS AS NEEDED
Status: DISCONTINUED | OUTPATIENT
Start: 2022-12-29 | End: 2022-12-29 | Stop reason: SURG

## 2022-12-29 RX ORDER — FENTANYL CITRATE 50 UG/ML
INJECTION, SOLUTION INTRAMUSCULAR; INTRAVENOUS AS NEEDED
Status: DISCONTINUED | OUTPATIENT
Start: 2022-12-29 | End: 2022-12-29 | Stop reason: SURG

## 2022-12-29 RX ORDER — ONDANSETRON 2 MG/ML
4 INJECTION INTRAMUSCULAR; INTRAVENOUS ONCE AS NEEDED
Status: DISCONTINUED | OUTPATIENT
Start: 2022-12-29 | End: 2022-12-29 | Stop reason: HOSPADM

## 2022-12-29 RX ORDER — CEFAZOLIN SODIUM 2 G/100ML
2 INJECTION, SOLUTION INTRAVENOUS ONCE
Status: DISCONTINUED | OUTPATIENT
Start: 2022-12-29 | End: 2022-12-29

## 2022-12-29 RX ORDER — MIDAZOLAM HYDROCHLORIDE 1 MG/ML
2 INJECTION INTRAMUSCULAR; INTRAVENOUS ONCE
Status: COMPLETED | OUTPATIENT
Start: 2022-12-29 | End: 2022-12-29

## 2022-12-29 RX ORDER — DEXAMETHASONE SODIUM PHOSPHATE 4 MG/ML
INJECTION, SOLUTION INTRA-ARTICULAR; INTRALESIONAL; INTRAMUSCULAR; INTRAVENOUS; SOFT TISSUE AS NEEDED
Status: DISCONTINUED | OUTPATIENT
Start: 2022-12-29 | End: 2022-12-29 | Stop reason: SURG

## 2022-12-29 RX ORDER — EPHEDRINE SULFATE 50 MG/ML
INJECTION, SOLUTION INTRAVENOUS AS NEEDED
Status: DISCONTINUED | OUTPATIENT
Start: 2022-12-29 | End: 2022-12-29 | Stop reason: SURG

## 2022-12-29 RX ORDER — PROMETHAZINE HYDROCHLORIDE 25 MG/1
25 SUPPOSITORY RECTAL ONCE AS NEEDED
Status: DISCONTINUED | OUTPATIENT
Start: 2022-12-29 | End: 2022-12-29 | Stop reason: HOSPADM

## 2022-12-29 RX ORDER — GLYCOPYRROLATE 0.2 MG/ML
0.2 INJECTION INTRAMUSCULAR; INTRAVENOUS
Status: COMPLETED | OUTPATIENT
Start: 2022-12-29 | End: 2022-12-29

## 2022-12-29 RX ADMIN — GLYCOPYRROLATE 0.2 MG: 0.2 INJECTION INTRAMUSCULAR; INTRAVENOUS at 08:08

## 2022-12-29 RX ADMIN — PROPOFOL 200 MG: 10 INJECTION, EMULSION INTRAVENOUS at 08:26

## 2022-12-29 RX ADMIN — HYDROMORPHONE HYDROCHLORIDE 0.5 MG: 1 INJECTION, SOLUTION INTRAMUSCULAR; INTRAVENOUS; SUBCUTANEOUS at 09:39

## 2022-12-29 RX ADMIN — MIDAZOLAM HYDROCHLORIDE 2 MG: 2 INJECTION, SOLUTION INTRAMUSCULAR; INTRAVENOUS at 08:08

## 2022-12-29 RX ADMIN — OXYCODONE HYDROCHLORIDE 5 MG: 5 TABLET ORAL at 09:55

## 2022-12-29 RX ADMIN — ONDANSETRON 4 MG: 2 INJECTION INTRAMUSCULAR; INTRAVENOUS at 08:30

## 2022-12-29 RX ADMIN — SODIUM CHLORIDE, POTASSIUM CHLORIDE, SODIUM LACTATE AND CALCIUM CHLORIDE 9 ML/HR: 600; 310; 30; 20 INJECTION, SOLUTION INTRAVENOUS at 07:54

## 2022-12-29 RX ADMIN — OXYCODONE HYDROCHLORIDE 5 MG: 5 TABLET ORAL at 09:39

## 2022-12-29 RX ADMIN — LIDOCAINE HYDROCHLORIDE 100 MG: 20 INJECTION, SOLUTION EPIDURAL; INFILTRATION; INTRACAUDAL; PERINEURAL at 08:26

## 2022-12-29 RX ADMIN — FENTANYL CITRATE 25 MCG: 50 INJECTION, SOLUTION INTRAMUSCULAR; INTRAVENOUS at 08:47

## 2022-12-29 RX ADMIN — EPHEDRINE SULFATE 10 MG: 50 INJECTION INTRAVENOUS at 08:40

## 2022-12-29 RX ADMIN — FENTANYL CITRATE 25 MCG: 50 INJECTION, SOLUTION INTRAMUSCULAR; INTRAVENOUS at 08:44

## 2022-12-29 RX ADMIN — DEXAMETHASONE SODIUM PHOSPHATE 4 MG: 4 INJECTION, SOLUTION INTRA-ARTICULAR; INTRALESIONAL; INTRAMUSCULAR; INTRAVENOUS; SOFT TISSUE at 08:30

## 2022-12-29 RX ADMIN — EPHEDRINE SULFATE 10 MG: 50 INJECTION INTRAVENOUS at 08:36

## 2022-12-29 RX ADMIN — Medication 2 G: at 08:23

## 2022-12-29 RX ADMIN — ACETAMINOPHEN 1000 MG: 500 TABLET ORAL at 07:51

## 2022-12-29 RX ADMIN — KETOROLAC TROMETHAMINE 30 MG: 30 INJECTION, SOLUTION INTRAMUSCULAR; INTRAVENOUS at 09:13

## 2022-12-29 RX ADMIN — HYDROMORPHONE HYDROCHLORIDE 0.5 MG: 1 INJECTION, SOLUTION INTRAMUSCULAR; INTRAVENOUS; SUBCUTANEOUS at 09:43

## 2022-12-29 RX ADMIN — FENTANYL CITRATE 50 MCG: 50 INJECTION, SOLUTION INTRAMUSCULAR; INTRAVENOUS at 08:24

## 2022-12-29 NOTE — ANESTHESIA POSTPROCEDURE EVALUATION
Patient: Sherly Jo    Procedure Summary     Date: 12/29/22 Room / Location: [facility] OR  / [facility]    Anesthesia Start: 0809 Anesthesia Stop: 0929    Procedures:       RIGHT CARPAL TUNNEL RELEASE (Right: Wrist)      SECOND FINGER TRIGGER RELEASE (Bilateral: Fingers) Diagnosis:       Right carpal tunnel syndrome      (Right carpal tunnel syndrome [G56.01])    Surgeons: Jonnathan Alegria MD Provider: Reyes, Mirabelle, DO    Anesthesia Type: general ASA Status: 2          Anesthesia Type: general    Vitals  Vitals Value Taken Time   /101 12/29/22 0944   Temp 36 °C (96.8 °F) 12/29/22 0929   Pulse 85 12/29/22 0953   Resp 12 12/29/22 0929   SpO2 96 % 12/29/22 0953   Vitals shown include unvalidated device data.        Post Anesthesia Care and Evaluation    Patient location during evaluation: bedside  Patient participation: complete - patient participated  Level of consciousness: awake  Pain management: adequate    Airway patency: patent  Anesthetic complications: No anesthetic complications  PONV Status: none  Cardiovascular status: acceptable and stable  Respiratory status: acceptable  Hydration status: acceptable    Comments: An Anesthesiologist personally participated in the most demanding procedures (including induction and emergence if applicable) in the anesthesia plan, monitored the course of anesthesia administration at frequent intervals and remained physically present and available for immediate diagnosis and treatment of emergencies.

## 2022-12-29 NOTE — DISCHARGE INSTRUCTIONS
DISCHARGE INSTRUCTIONS  CARPAL TUNNEL RELEASE      For your surgery you had:  General anesthesia (you may have a sore throat for the first 24 hours)  IV sedation  Local anesthesia  Monitored anesthesia care  Regional Anesthesia    You may experience dizziness, drowsiness, or lightheadedness for several hours following surgery.  Do not stay alone today or tonight.  Limit your activity for 24 hours.  Resume your diet slowly.    You should not drive or operate machinery, drink alcohol, or sign legally binding documents for 24 hours or while you are taking pain medication.  If you had an axillary or regional block, you may not have control of the involved limb for up to 12 hours. Protect the arm with a sling or follow your physician's specific instructions. Carry the upper arm in a sling so that the hand and wrist are above the level of the heart. Elevate affected arm on a pillow when resting.   Use ice to affected area for 48-72 hours. Apply 20 minutes on - 20 minutes off. Do NOT apply directly to skin.  Exercise fingers frequently by making a full fist and fully straightening the fingers. This will help prevent swelling and stiffness.  Do NOT do any heavy lifting, pulling or strenuous activities using the affected hand. [x] Keep splint clean and dry.  [x]  Discontinue ace bandage LEFT HAND in 3 DAYS       .  [x] Do NOT submerge in water.  [x] Keep incision area clean and dry.  [x] You may shower IN 3 DAYS ,KEEPING RIGHT HAND SPLINT CLEAN AND DRY     .  NOTIFY YOUR DOCTOR IF YOU EXPERIENCE ANY OF THE FOLLOWING:  Temperature greater than 101 degrees Fahrenheit  Shaking Chills  Redness or excessive drainage from incision  Nausea, vomiting and/or pain that is not controlled by prescribed medications  Increase in bleeding or bleeding that is excessive  Unable to urinate in 6 hours after surgery  If unable to reach your doctor, please go to the closest Emergency Room  Last dose of pain medication was given at:   .  You  should see   for follow-up care   on   .  Phone number:      SPECIAL INSTRUCTIONS:               I have read and received the above instructions.

## 2022-12-29 NOTE — H&P
h and p      Chief Complaint  Follow-up of the Left Hand        Subjective          Sherly Jo presents to [facility] for follow up of the left hand. He had on 11/18/22 a trigger finger on the left hand 2 nd digit and a left carpal tunnel release.  He still having popping of catching of the left hand. He has surgery scheduled to the right wrist carpal tunnel and right 2 nd digit.  Due to the pooping he will also have a left trigger finger on the 2 nd digit.  He is having no problems with the left wrist.           Allergies   Allergen Reactions   • Codeine Anaphylaxis         Social History            Socioeconomic History   • Marital status:    Tobacco Use   • Smoking status: Never   • Smokeless tobacco: Former       Types: Chew   Vaping Use   • Vaping Use: Never used   Substance and Sexual Activity   • Alcohol use: Yes       Comment: CURRENT SOME DAY   • Drug use: Never   • Sexual activity: Defer         Review of Systems            Objective      Vital Signs:   Pulse 74   Ht 185.4 cm (73\")   Wt 117 kg (259 lb)   SpO2 97%   BMI 34.17 kg/m²        Physical Exam  Constitutional:       Appearance: Normal appearance. Patient is well-developed and normal weight.   HENT:      Head: Normocephalic.      Right Ear: Hearing and external ear normal.      Left Ear: Hearing and external ear normal.      Nose: Nose normal.   Eyes:      Conjunctiva/sclera: Conjunctivae normal.   Cardiovascular:      Rate and Rhythm: Normal rate.   Pulmonary:      Effort: Pulmonary effort is normal.      Breath sounds: No wheezing or rales.   Abdominal:      Palpations: Abdomen is soft.      Tenderness: There is no abdominal tenderness.   Musculoskeletal:      Cervical back: Normal range of motion.   Skin:     Findings: No rash.   Neurological:      Mental Status: Patient  is alert and oriented to person, place, and time.   Psychiatric:         Mood and Affect: Mood and affect normal.          Judgment: Judgment normal.         Ortho Exam       LEFT HAND Sensation intact. Neurovascular Intact. Full , thumb opposition, MCP flexors, DIP flexors and PIP flexors. No evidence of wound dehiscence, surrounding erythema, warmth, or drainage. Left hand 2 nd digit is still popping and catching and will release that also with the surgery on the 29th.      Procedures               Imaging Results (Most Recent)      None                   Result Review    :                  Assessment      Assessment and Plan      Diagnoses and all orders for this visit:     1. Right carpal tunnel syndrome (Primary)     2. Trigger index finger of right hand     3. Left carpal tunnel syndrome     4. Trigger index finger of left hand           Discussed the treatment plan with the patient. His incision looks good on the left hand and still having popping and catching of the left hand trigger finger 2nd digit. Next week he is getting a right carpal tunnel and 2nd digit trigger finger release. I will also have a left 2 nd digit release to revise his first A1 pulley surgery.      Call or return if worsening symptoms.     Follow Up      Postoperatively         Jonnathan Alegria MD  12/29/22

## 2022-12-29 NOTE — ANESTHESIA PREPROCEDURE EVALUATION
Anesthesia Evaluation     Patient summary reviewed and Nursing notes reviewed   no history of anesthetic complications:  NPO Solid Status: > 8 hours  NPO Liquid Status: > 2 hours           Airway   Mallampati: I  TM distance: >3 FB  Neck ROM: full  No difficulty expected  Dental - normal exam     Pulmonary - normal exam    breath sounds clear to auscultation  (+) sleep apnea,   Cardiovascular - negative cardio ROS and normal exam  Exercise tolerance: good (4-7 METS)    Rhythm: regular  Rate: normal        Neuro/Psych  (+) numbness, psychiatric history Anxiety and Depression,    GI/Hepatic/Renal/Endo    (+)  GERD,  diabetes mellitus (pre-diabetes),     Musculoskeletal (-) negative ROS    Abdominal    Substance History - negative use     OB/GYN negative ob/gyn ROS         Other - negative ROS       ROS/Med Hx Other: PAT Nursing Notes unavailable.                 Anesthesia Plan    ASA 2     general     (Patient understands anesthesia not responsible for dental damage.)  intravenous induction     Anesthetic plan, risks, benefits, and alternatives have been provided, discussed and informed consent has been obtained with: patient.  Pre-procedure education provided  Use of blood products discussed with patient  Consented to blood products.   Plan discussed with CRNA.        CODE STATUS:

## 2023-01-06 ENCOUNTER — OFFICE VISIT (OUTPATIENT)
Dept: INTERNAL MEDICINE | Facility: CLINIC | Age: 58
End: 2023-01-06
Payer: COMMERCIAL

## 2023-01-06 VITALS
TEMPERATURE: 98.1 F | DIASTOLIC BLOOD PRESSURE: 80 MMHG | BODY MASS INDEX: 34.4 KG/M2 | OXYGEN SATURATION: 98 % | WEIGHT: 259.6 LBS | HEIGHT: 73 IN | HEART RATE: 70 BPM | SYSTOLIC BLOOD PRESSURE: 130 MMHG

## 2023-01-06 DIAGNOSIS — B37.2 YEAST INFECTION OF THE SKIN: Primary | ICD-10-CM

## 2023-01-06 DIAGNOSIS — K64.4 EXTERNAL HEMORRHOID: ICD-10-CM

## 2023-01-06 PROCEDURE — 99213 OFFICE O/P EST LOW 20 MIN: CPT | Performed by: NURSE PRACTITIONER

## 2023-01-06 RX ORDER — HYDROCORTISONE 25 MG/G
CREAM TOPICAL 2 TIMES DAILY
Qty: 28 G | Refills: 1 | Status: SHIPPED | OUTPATIENT
Start: 2023-01-06

## 2023-01-06 RX ORDER — HYDROCORTISONE ACETATE AND PRAMOXINE HYDROCHLORIDE 25; 10 MG/G; MG/G
1 CREAM TOPICAL 3 TIMES DAILY
Qty: 28.4 G | Refills: 1 | Status: SHIPPED | OUTPATIENT
Start: 2023-01-06

## 2023-01-06 RX ORDER — HYDROXYZINE HYDROCHLORIDE 25 MG/1
25 TABLET, FILM COATED ORAL 3 TIMES DAILY PRN
Qty: 30 TABLET | Refills: 0 | Status: SHIPPED | OUTPATIENT
Start: 2023-01-06

## 2023-01-06 NOTE — PROGRESS NOTES
Chief Complaint  Rash (Rash on legs, arms. Also has a bad Hemorid, been on preparation h. )  Subjective        History of Present Illness  Sherly Jo is a 57 y.o. male who presents to [facility] for complaint of skin rash in axilla and groin areas  for 4 days.  Positive for itching .He had carpal tunnel surgery a week ago and experienced constipation due to pain medication. That has resolved but is now reports he has an external Hemorid which is painful and itching. He has used over-the-counter medications with hydrocortisone and has not had any relief.       Past Medical History:   Diagnosis Date   • Carpal tunnel syndrome     RIGHT   • GERD (gastroesophageal reflux disease)    • Prostate disorder     BPH   • Seasonal allergies    • Sleep apnea     DOESNT USE CPAP CURRENTLY   • Trigger finger     KATERINE 2ND DIGITS        Past Surgical History:   Procedure Laterality Date   • APPENDECTOMY     • BACK SURGERY      L4/L5 DISC HERNIATION   • CARPAL TUNNEL RELEASE Left 11/03/2022    Procedure: LEFT CARPAL TUNNEL RELEASE, AND LEFT INDEX MIDDLE FINGER AND TRIGGER FINGER RELEASE;  Surgeon: Jonnathan Alegria MD;  Location: [facility];  Service: Orthopedics;  Laterality: Left;   • CARPAL TUNNEL RELEASE Right 12/29/2022    Procedure: RIGHT CARPAL TUNNEL RELEASE;  Surgeon: Jonnathan Alegria MD;  Location: [facility] OR [facility];  Service: Orthopedics;  Laterality: Right;   • COLONOSCOPY     • ENDOSCOPY     • HERNIA REPAIR Right     INGUINAL HERNIA   • SHOULDER ARTHROSCOPY Right     FOR SAD/CD AND ROTATOR CUFF REPAIR ON 12/21/17   • TOTAL KNEE ARTHROPLASTY Right    • TRACHEOSTOMY      X2 S/P THROAT TRAUMA   • TRIGGER FINGER RELEASE Bilateral 12/29/2022    Procedure: SECOND FINGER TRIGGER RELEASE;  Surgeon: Jonnathan Alegria MD;  Location: [facility];  Service: Orthopedics;  Laterality: Bilateral;        Allergies   Allergen Reactions   • Codeine Anaphylaxis          Current Outpatient  Medications:   •  ibuprofen (ADVIL,MOTRIN) 200 MG tablet, Take 200 mg by mouth Every 6 (Six) Hours As Needed for Mild Pain. LAST DOSE 12/14/22, Disp: , Rfl:   •  multivitamin with minerals tablet tablet, Take 1 tablet by mouth Daily. LAST DOSE 12/18/22, Disp: , Rfl:   •  Hydrocortisone, Perianal, (ANUSOL-HC) 2.5 % rectal cream, Insert  into the rectum 2 (Two) Times a Day., Disp: 28 g, Rfl: 1  •  hydrOXYzine (ATARAX) 25 MG tablet, Take 1 tablet by mouth 3 (Three) Times a Day As Needed for Itching., Disp: 30 tablet, Rfl: 0  •  nystatin-triamcinolone (MYCOLOG II) 836818-6.1 UNIT/GM-% cream, Apply 1 application topically to the appropriate area as directed 2 (Two) Times a Day., Disp: 60 g, Rfl: 0  •  pramoxine-hydrocortisone (Pramosone) 1-2.5 % cream, Apply 1 application topically to the appropriate area as directed 3 (Three) Times a Day., Disp: 28.4 g, Rfl: 1    Objective   /80 (BP Location: Left arm, Patient Position: Sitting, Cuff Size: Large Adult)   Pulse 70   Temp 98.1 °F (36.7 °C) (Temporal)   Ht 185.4 cm (73\")   Wt 118 kg (259 lb 9.6 oz)   SpO2 98%   BMI 34.25 kg/m²    Estimated body mass index is 34.25 kg/m² as calculated from the following:    Height as of this encounter: 185.4 cm (73\").    Weight as of this encounter: 118 kg (259 lb 9.6 oz).   Physical Exam  Vitals reviewed.   Constitutional:       General: He is not in acute distress.     Appearance: Normal appearance.   HENT:      Head: Normocephalic and atraumatic.   Pulmonary:      Effort: Pulmonary effort is normal.   Skin:     Findings: Erythema and rash present. Rash is papular and scaling.      Comments: Located axilla and groins bilateral.  Satellite lesions present.   Neurological:      General: No focal deficit present.      Mental Status: He is alert.   Psychiatric:         Thought Content: Thought content normal.        Result Review :                   Assessment and Plan    Diagnoses and all orders for this visit:    1. Yeast  infection of the skin (Primary)    2. External hemorrhoid    Other orders  -     nystatin-triamcinolone (MYCOLOG II) 292324-0.1 UNIT/GM-% cream; Apply 1 application topically to the appropriate area as directed 2 (Two) Times a Day.  Dispense: 60 g; Refill: 0  -     hydrOXYzine (ATARAX) 25 MG tablet; Take 1 tablet by mouth 3 (Three) Times a Day As Needed for Itching.  Dispense: 30 tablet; Refill: 0  -     pramoxine-hydrocortisone (Pramosone) 1-2.5 % cream; Apply 1 application topically to the appropriate area as directed 3 (Three) Times a Day.  Dispense: 28.4 g; Refill: 1  -     Hydrocortisone, Perianal, (ANUSOL-HC) 2.5 % rectal cream; Insert  into the rectum 2 (Two) Times a Day.  Dispense: 28 g; Refill: 1    Education on diagnosis, medication and treatment plan.    Follow Up     Patient was given instructions and counseling regarding his condition. Please see specific information pulled into the AVS if appropriate.   No follow-ups on file.    JACQUELINE Smith

## 2023-01-11 ENCOUNTER — OFFICE VISIT (OUTPATIENT)
Dept: ORTHOPEDIC SURGERY | Facility: CLINIC | Age: 58
End: 2023-01-11
Payer: COMMERCIAL

## 2023-01-11 VITALS — WEIGHT: 259 LBS | BODY MASS INDEX: 34.33 KG/M2 | HEIGHT: 73 IN

## 2023-01-11 DIAGNOSIS — Z47.89 AFTERCARE FOLLOWING SURGERY OF THE MUSCULOSKELETAL SYSTEM: Primary | ICD-10-CM

## 2023-01-11 PROCEDURE — 99024 POSTOP FOLLOW-UP VISIT: CPT | Performed by: PHYSICIAN ASSISTANT

## 2023-01-11 RX ORDER — CEPHALEXIN 500 MG/1
500 CAPSULE ORAL EVERY 6 HOURS
Qty: 40 CAPSULE | Refills: 0 | Status: SHIPPED | OUTPATIENT
Start: 2023-01-11 | End: 2023-03-24

## 2023-01-11 RX ORDER — AMOXICILLIN 250 MG/1
250 CAPSULE ORAL 3 TIMES DAILY
COMMUNITY
End: 2023-01-11

## 2023-01-11 NOTE — PATIENT INSTRUCTIONS
Sutures removed in office.  Patient educated on incision care.  Please keep incision clean and dry.  Do not soak or submerge in water until incision is fully healed.  Do not apply creams or lotions over the incision.      Continue icing as needed to help with pain and swelling.  Ice up to 3 or 4 times daily for no longer than 15 to 20 minutes at a time.    Order sent for patient to start hand therapy. Continue home exercises. Advised return to R carpal tunnel brace at night for the next 4 weeks. No heavy lifting, pushing, or pulling. Avoid repetitive motions of the hands/wrists.     Prescription for keflex sent to pharmacy for Left TF incision. Advised to call with any changes to this.    Follow-up in 4 weeks. Repeat x-rays not needed at this visit.  Please call with questions or concerns.

## 2023-01-11 NOTE — PROGRESS NOTES
"Chief Complaint  Follow-up of the Left Hand and Follow-up of the Right Hand    Subjective      Sherly Jo presents to [facility] for follow-up of right carpal tunnel release and bilateral second finger trigger finger release (recurrent on left side).  He reports he has had complete resolution of right hand carpal tunnel syndrome symptoms postoperatively.  However, patient reports concern for 1 isolated triggering episode to the right second finger and at least 2-3 episodes on the left second finger.  He states he has significant stiffness and swelling to his hands still.  Does report that he was concerned about infection on his left hand incision over the last few days and started leftover amoxicillin at home.    Objective   Allergies   Allergen Reactions   • Codeine Anaphylaxis       Vital Signs:   Ht 185.4 cm (73\")   Wt 117 kg (259 lb)   BMI 34.17 kg/m²       Physical Exam    Constitutional: Awake, alert. Well nourished appearance.    Integumentary: Warm, dry, intact. No obvious rashes.    HENT: Atraumatic, normocephalic.   Respiratory: Non labored respirations .   Cardiovascular: Intact peripheral pulses.    Psychiatric: Normal mood and affect. A&O X3    Ortho Exam  Left hand: Left second finger TF release incision visualized and is clean, dry, and intact.  There is mild surrounding erythema.  No fluctuance or drainage appreciated.  Patient is able to form a fist.  Sensation intact to light touch.  Distal neurovascular intact.  No further triggering noted on exam.    Right hand: Right second finger TF release incision visualized and is clean, dry, and intact, as is right wrist incision.  There is no evidence of wound dehiscence, surrounding erythema, warmth, or drainage.  Full flexion and extension of the wrist.  Full supination and pronation.  Patient is able to form a full fist.  Sensation intact to light touch.  Distal neurovascular intact.  No triggering of the second " finger identified on exam today.    Imaging Results (Most Recent)     None                    Assessment and Plan   Problem List Items Addressed This Visit    None  Visit Diagnoses     Aftercare following R CTR and R 2nd finger TF release, and L 2nd TF release    -  Primary    Relevant Orders    Ambulatory Referral to Physical Therapy POST OP (Completed)    Ambulatory Referral to Occupational Therapy (Completed)        Follow Up   Return in about 4 weeks (around 2/8/2023).  Educated on risk of smoking. Discussed options for smoking cessation.    Patient Instructions   Sutures removed in office.  Patient educated on incision care.  Please keep incision clean and dry.  Do not soak or submerge in water until incision is fully healed.  Do not apply creams or lotions over the incision.      Continue icing as needed to help with pain and swelling.  Ice up to 3 or 4 times daily for no longer than 15 to 20 minutes at a time.    Order sent for patient to start hand therapy. Continue home exercises. Advised return to R carpal tunnel brace at night for the next 4 weeks. No heavy lifting, pushing, or pulling. Avoid repetitive motions of the hands/wrists.     Prescription for keflex sent to pharmacy for Left TF incision. Advised to call with any changes to this.    Follow-up in 4 weeks. Repeat x-rays not needed at this visit.  Please call with questions or concerns.      Patient was given instructions and counseling regarding his condition or for health maintenance advice. Please see specific information pulled into the AVS if appropriate.

## 2023-02-10 ENCOUNTER — OFFICE VISIT (OUTPATIENT)
Dept: ORTHOPEDIC SURGERY | Facility: CLINIC | Age: 58
End: 2023-02-10
Payer: COMMERCIAL

## 2023-02-10 VITALS — HEIGHT: 73 IN | BODY MASS INDEX: 34.33 KG/M2 | WEIGHT: 259 LBS

## 2023-02-10 DIAGNOSIS — Z47.89 AFTERCARE FOLLOWING SURGERY OF THE MUSCULOSKELETAL SYSTEM: Primary | ICD-10-CM

## 2023-02-10 PROCEDURE — 99024 POSTOP FOLLOW-UP VISIT: CPT | Performed by: PHYSICIAN ASSISTANT

## 2023-02-10 RX ORDER — METHYLPREDNISOLONE 4 MG/1
TABLET ORAL
Qty: 21 TABLET | Refills: 0 | Status: SHIPPED | OUTPATIENT
Start: 2023-02-10 | End: 2023-03-24 | Stop reason: SDUPTHER

## 2023-02-10 NOTE — PROGRESS NOTES
"Chief Complaint  Pain and Follow-up of the Left Hand and Pain and Follow-up of the Right Hand    Subjective      Sherly Jo presents to [facility] for follow-up of right carpal tunnel release and bilateral second finger trigger finger release (recurrent on left side) performed by Dr. Alegria on 12/29/2022.  He presents today for follow-up with KT tape in place to the right hand.  States he is participating in outpatient physical therapy at [facility] with anticipated therapy for the next 6 weeks.  Reports he is doing very well from a pain and strength standpoint, however, continues to have swelling and \"popping\" to his fingers.  Reports his hands ache in the mornings and this is relieved by stretching and activity after approximately 30 minutes.    Objective   Allergies   Allergen Reactions   • Codeine Anaphylaxis       Vital Signs:   Ht 185.4 cm (73\")   Wt 117 kg (259 lb)   BMI 34.17 kg/m²       Physical Exam    Constitutional: Awake, alert. Well nourished appearance.    Integumentary: Warm, dry, intact. No obvious rashes.    HENT: Atraumatic, normocephalic.   Respiratory: Non labored respirations .   Cardiovascular: Intact peripheral pulses.    Psychiatric: Normal mood and affect. A&O X3    Ortho Exam  Bilateral hands: Right hand noted with KT tape in place.  Mild edema noted to the fingers bilaterally.  Patient is able to form a full fist.  Good thumb opposition.  Sensation intact to light touch.  Well-healed surgical scars noted.  Distal neurovascular intact with brisk capillary refill and 2+ radial and ulnar pulses noted.    Imaging Results (Most Recent)     None                  Assessment and Plan   Problem List Items Addressed This Visit    None  Visit Diagnoses     Aftercare following R CTR and R 2nd finger TF release, and L 2nd TF release    -  Primary        Follow Up   Return in about 6 weeks (around 3/24/2023).  Patient is a non-smoker.  Did not discuss options for " smoking cessation.    Patient Instructions   Patient is progressing well, although having residual swelling. Advised to continue PT to completion to progress strength and ROM. Continue home exercises. Prescription for medrol dosepak sent. Patient advised to take Medrol as prescribed. May start Diclofenac after completion of Medrol, if needed. Do not take either with other NSAIDs.     Continue icing as needed up to 4 times daily for no longer than 15-20 mins at a time.     Follow-up in 6 weeks. Call with changes or concerns.       Patient was given instructions and counseling regarding his condition or for health maintenance advice. Please see specific information pulled into the AVS if appropriate.

## 2023-02-10 NOTE — PATIENT INSTRUCTIONS
Patient is progressing well, although having residual swelling. Advised to continue PT to completion to progress strength and ROM. Continue home exercises. Prescription for medrol dosepak sent. Patient advised to take Medrol as prescribed. May start Diclofenac after completion of Medrol, if needed. Do not take either with other NSAIDs.     Continue icing as needed up to 4 times daily for no longer than 15-20 mins at a time.     Follow-up in 6 weeks. Call with changes or concerns.

## 2023-03-24 ENCOUNTER — OFFICE VISIT (OUTPATIENT)
Dept: ORTHOPEDIC SURGERY | Facility: CLINIC | Age: 58
End: 2023-03-24
Payer: COMMERCIAL

## 2023-03-24 VITALS — HEART RATE: 92 BPM | WEIGHT: 259 LBS | HEIGHT: 73 IN | BODY MASS INDEX: 34.33 KG/M2 | OXYGEN SATURATION: 95 %

## 2023-03-24 DIAGNOSIS — Z47.89 AFTERCARE FOLLOWING SURGERY OF THE MUSCULOSKELETAL SYSTEM: Primary | ICD-10-CM

## 2023-03-24 PROCEDURE — 99213 OFFICE O/P EST LOW 20 MIN: CPT | Performed by: PHYSICIAN ASSISTANT

## 2023-03-24 RX ORDER — METHYLPREDNISOLONE 4 MG/1
TABLET ORAL
Qty: 21 TABLET | Refills: 0 | Status: SHIPPED | OUTPATIENT
Start: 2023-03-24

## 2023-03-24 NOTE — PROGRESS NOTES
"Chief Complaint  Pain and Follow-up of the Left Hand and Pain and Follow-up of the Right Hand    Subjective      Sherly Jo presents to [facility] for follow-up of right carpal tunnel release and bilateral second trigger finger releases (recurrent on left side) performed by Dr. Wagner on 5/29/2022.  He presents today for follow-up reporting that his left second and third digits and right second digits remain stiff and swollen, to the point where they continue to awaken him at night.  He has been out of physical therapy for several weeks due to completion of goals.  He has continued with home exercise program.  He has not noted any more locking/triggering of his fingers.  Does report that his swelling is markedly worsened after driving for extended periods of time.  He does get relief with position changes, stretching, icing.  Also, had moderate improvement with previous Medrol Dosepak prescribed at his last office appointment on 2/10/2023.    Objective   Allergies   Allergen Reactions   • Codeine Anaphylaxis       Vital Signs:   Pulse 92   Ht 185.4 cm (73\")   Wt 117 kg (259 lb)   SpO2 95%   BMI 34.17 kg/m²       Physical Exam    Constitutional: Awake, alert. Well nourished appearance.    Integumentary: Warm, dry, intact. No obvious rashes.    HENT: Atraumatic, normocephalic.   Respiratory: Non labored respirations .   Cardiovascular: Intact peripheral pulses.    Psychiatric: Normal mood and affect. A&O X3    Ortho Exam  Bilateral hands: Well-healed surgical scars noted to the palmar aspect of the right hand and A1 pulleys of the bilateral second fingers.  Patient is able to form a full fist.  Good thumb opposition.  Sensation reported intact to light touch.  Distal neurovascular intact with brisk capillary refill and 2+ radial ulnar pulses noted.  There is mild edema to the left second and third digits and right second digit, although with no abnormal temperature or color " change.    Imaging Results (Most Recent)     None                  Assessment and Plan   Problem List Items Addressed This Visit    None  Visit Diagnoses     Aftercare following R CTR and R 2nd finger TF release, and L 2nd TF release    -  Primary        Follow Up   Return if symptoms worsen or fail to improve.  Patient is a non-smoker. Did not discuss options for smoking cessation.    Patient Instructions   Patient is progressing well with home exercises and has discontinued PT. Advised to continue home exercises.     Continue icing as needed up to 4 times daily for no longer than 15-20 mins at a time. Prescription for medrol sent to pharmacy. Do not resume Diclofenac until after completion. Do not take Medrol or diclofenac with other NSAIDs.     Follow-up as needed. Call with changes or concerns.       Patient was given instructions and counseling regarding his condition or for health maintenance advice. Please see specific information pulled into the AVS if appropriate.

## 2023-03-24 NOTE — PATIENT INSTRUCTIONS
Patient is progressing well with home exercises and has discontinued PT. Advised to continue home exercises.     Continue icing as needed up to 4 times daily for no longer than 15-20 mins at a time. Prescription for medrol sent to pharmacy. Do not resume Diclofenac until after completion. Do not take Medrol or diclofenac with other NSAIDs.     Follow-up as needed. Call with changes or concerns.

## 2024-01-05 ENCOUNTER — TELEMEDICINE (OUTPATIENT)
Dept: INTERNAL MEDICINE | Facility: CLINIC | Age: 59
End: 2024-01-05
Payer: COMMERCIAL

## 2024-01-05 VITALS — BODY MASS INDEX: 33.13 KG/M2 | HEIGHT: 73 IN | WEIGHT: 250 LBS

## 2024-01-05 DIAGNOSIS — I10 PRIMARY HYPERTENSION: ICD-10-CM

## 2024-01-05 DIAGNOSIS — U07.1 COVID-19 VIRUS INFECTION: Primary | ICD-10-CM

## 2024-01-05 DIAGNOSIS — E55.9 VITAMIN D DEFICIENCY: ICD-10-CM

## 2024-01-05 DIAGNOSIS — R05.1 ACUTE COUGH: ICD-10-CM

## 2024-01-05 DIAGNOSIS — R73.03 PREDIABETES: ICD-10-CM

## 2024-01-05 DIAGNOSIS — R06.2 WHEEZING: ICD-10-CM

## 2024-01-05 PROCEDURE — 99213 OFFICE O/P EST LOW 20 MIN: CPT | Performed by: INTERNAL MEDICINE

## 2024-01-05 RX ORDER — DEXTROMETHORPHAN HYDROBROMIDE AND PROMETHAZINE HYDROCHLORIDE 15; 6.25 MG/5ML; MG/5ML
5 SYRUP ORAL 4 TIMES DAILY PRN
Qty: 473 ML | Refills: 0 | Status: SHIPPED | OUTPATIENT
Start: 2024-01-05

## 2024-01-05 RX ORDER — ALBUTEROL SULFATE 90 UG/1
AEROSOL, METERED RESPIRATORY (INHALATION)
Qty: 18 G | Refills: 1 | Status: SHIPPED | OUTPATIENT
Start: 2024-01-05

## 2024-01-05 RX ORDER — METHYLPREDNISOLONE 4 MG/1
TABLET ORAL
Qty: 21 TABLET | Refills: 0 | Status: SHIPPED | OUTPATIENT
Start: 2024-01-05 | End: 2024-01-10

## 2024-01-05 RX ORDER — AZITHROMYCIN 250 MG/1
TABLET, FILM COATED ORAL
Qty: 6 TABLET | Refills: 0 | Status: SHIPPED | OUTPATIENT
Start: 2024-01-05

## 2024-01-05 NOTE — ASSESSMENT & PLAN NOTE
Positive home COVID test yesterday symptoms present for the past 4 to 5 days    Plan-start Paxlovid as directed for 5 days side effects discussed patient not on any chronic medications  Use Medrol Dosepak Z-Geovani if sputum becomes more purulent discussed that this is for a bacterial infection  Albuterol inhaler as needed for shortness of air  Supportive treatment drink lots of fluids use promethazine-DM as directed as needed for cough caution may cause drowsiness recommend using at night  Per CDC recommendations mask and isolate for 5 days and then continue to mask for another 5 days  Patient states due to go on a trip to [place] in 3 weeks  Discussed possible continued post-COVID symptoms myalgias shortness of air chest pain after finished with the 10-day period  Patient needs to follow-up within the next 4 to 6 weeks for routine checkup and labs 1 week prior.

## 2024-01-05 NOTE — PROGRESS NOTES
CHIEF COMPLAINT  Sherly Jo presents to [facility] for follow-up of Covid-19 Home Monitoring Video Visit (Pt tested positive for covid at home 1/4/2024; congestion, cough, head congestion, head pressure, head ache ).    HPI    58-year-old patient with positive COVID test at home yesterday 1/4/2024.  Complaint of cough -phlegm-congestion and headache. 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  ROS-numbness of hands and locking up of both hands      Current Outpatient Medications:     albuterol sulfate  (90 Base) MCG/ACT inhaler, 2 puffs QID prn, Disp: 18 g, Rfl: 1    azithromycin (Zithromax Z-Geovani) 250 MG tablet, Take 2 tablets by mouth on day 1, then 1 tablet daily on days 2-5, Disp: 6 tablet, Rfl: 0    diclofenac (VOLTAREN) 50 MG EC tablet, TAKE 1 TABLET BY MOUTH TWICE DAILY AS NEEDED FOR PAIN OR SWELLING. MAY. START AFTER COMPLETION OF MEDROL DOSEPAK (Patient not taking: Reported on 1/5/2024), Disp: 60 tablet, Rfl: 0    Hydrocortisone, Perianal, (ANUSOL-HC) 2.5 % rectal cream, Insert  into the rectum 2 (Two) Times a Day. (Patient not taking: Reported on 1/5/2024), Disp: 28 g, Rfl: 1    hydrOXYzine (ATARAX) 25 MG tablet, Take 1 tablet by mouth 3 (Three) Times a Day As Needed for Itching. (Patient not taking: Reported on 1/5/2024), Disp: 30 tablet, Rfl: 0    methylPREDNISolone (MEDROL) 4 MG dose pack, Use as directed by  "package instructions (Patient not taking: Reported on 1/5/2024), Disp: 21 tablet, Rfl: 0    methylPREDNISolone (MEDROL) 4 MG dose pack, Take 6 tablets by mouth Daily for 1 day, THEN 5 tablets Daily for 1 day, THEN 4 tablets Daily for 1 day, THEN 3 tablets Daily for 1 day, THEN 2 tablets Daily for 1 day, THEN 1 tablet Daily for 1 day. Take as directed on package instructions., Disp: 21 tablet, Rfl: 0    multivitamin with minerals tablet tablet, Take 1 tablet by mouth Daily. LAST DOSE 12/18/22 (Patient not taking: Reported on 1/5/2024), Disp: , Rfl:     Nirmatrelvir & Ritonavir, 300mg/100mg, (PAXLOVID) 20 x 150 MG & 10 x 100MG tablet therapy pack tablet, Take 3 tablets by mouth 2 (Two) Times a Day., Disp: 30 tablet, Rfl: 0    nystatin-triamcinolone (MYCOLOG II) 172996-0.1 UNIT/GM-% cream, Apply 1 application topically to the appropriate area as directed 2 (Two) Times a Day. (Patient not taking: Reported on 1/5/2024), Disp: 60 g, Rfl: 0    pramoxine-hydrocortisone (Pramosone) 1-2.5 % cream, Apply 1 application topically to the appropriate area as directed 3 (Three) Times a Day. (Patient not taking: Reported on 1/5/2024), Disp: 28.4 g, Rfl: 1    promethazine-dextromethorphan (PROMETHAZINE-DM) 6.25-15 MG/5ML syrup, Take 5 mL by mouth 4 (Four) Times a Day As Needed for Cough., Disp: 473 mL, Rfl: 0   PFSH reviewed.      OBJECTIVE  Vital Signs  Vitals:    01/05/24 1107   Weight: 113 kg (250 lb)   Height: 185.4 cm (72.99\")      Body mass index is 32.99 kg/m².    Physical Exam  Vitals and nursing note reviewed.   Constitutional:       Appearance: Normal appearance.      Comments: NAD -speaking in full sentences-no stridor   HENT:      Head: Normocephalic and atraumatic.      Nose: Nose normal.   Eyes:      Extraocular Movements: Extraocular movements intact.      Pupils: Pupils are equal, round, and reactive to light.   Abdominal:      General: Abdomen is flat.      Palpations: Abdomen is soft.   Musculoskeletal:      " "Cervical back: Normal range of motion and neck supple.   Skin:     General: Skin is warm and dry.   Neurological:      General: No focal deficit present.      Mental Status: He is alert and oriented to person, place, and time.   Psychiatric:         Mood and Affect: Mood normal.         Behavior: Behavior normal.          RESULTS REVIEW  No results found for: \"PROBNP\", \"BNP\"          Lab Results   Component Value Date    TSH 4.310 (H) 03/31/2022    TSH 4.280 (H) 05/27/2021      Lab Results   Component Value Date    FREET4 1.18 03/31/2022    FREET4 1.2 05/27/2021         Lab Results   Component Value Date    CIVK08SK 20.6 (L) 03/31/2022        No Images in the past 120 days found..             ASSESSMENT & PLAN  Diagnoses and all orders for this visit:    1. COVID-19 virus infection (Primary)  Assessment & Plan:  Positive home COVID test yesterday symptoms present for the past 4 to 5 days    Plan-start Paxlovid as directed for 5 days side effects discussed patient not on any chronic medications  Use Medrol Dosepak Z-Geovani if sputum becomes more purulent discussed that this is for a bacterial infection  Albuterol inhaler as needed for shortness of air  Supportive treatment drink lots of fluids use promethazine-DM as directed as needed for cough caution may cause drowsiness recommend using at night  Per CDC recommendations mask and isolate for 5 days and then continue to mask for another 5 days  Patient states due to go on a trip to [place] in 3 weeks  Discussed possible continued post-COVID symptoms myalgias shortness of air chest pain after finished with the 10-day period  Patient needs to follow-up within the next 4 to 6 weeks for routine checkup and labs 1 week prior.    Orders:  -     methylPREDNISolone (MEDROL) 4 MG dose pack; Take 6 tablets by mouth Daily for 1 day, THEN 5 tablets Daily for 1 day, THEN 4 tablets Daily for 1 day, THEN 3 tablets Daily for 1 day, THEN 2 tablets Daily for 1 day, THEN 1 tablet " Daily for 1 day. Take as directed on package instructions.  Dispense: 21 tablet; Refill: 0  -     albuterol sulfate  (90 Base) MCG/ACT inhaler; 2 puffs QID prn  Dispense: 18 g; Refill: 1  -     azithromycin (Zithromax Z-Geovani) 250 MG tablet; Take 2 tablets by mouth on day 1, then 1 tablet daily on days 2-5  Dispense: 6 tablet; Refill: 0  -     Nirmatrelvir & Ritonavir, 300mg/100mg, (PAXLOVID) 20 x 150 MG & 10 x 100MG tablet therapy pack tablet; Take 3 tablets by mouth 2 (Two) Times a Day.  Dispense: 30 tablet; Refill: 0  -     promethazine-dextromethorphan (PROMETHAZINE-DM) 6.25-15 MG/5ML syrup; Take 5 mL by mouth 4 (Four) Times a Day As Needed for Cough.  Dispense: 473 mL; Refill: 0  -     Comprehensive Metabolic Panel; Future  -     Lipid Panel; Future  -     CBC & Differential; Future  -     Vitamin B12; Future  -     Folate; Future  -     Vitamin D,25-Hydroxy; Future  -     Magnesium; Future  -     Microalbumin / Creatinine Urine Ratio - Urine, Clean Catch; Future  -     Hemoglobin A1c; Future  -     TSH+Free T4; Future  -     T3, Free; Future    2. Acute cough  -     methylPREDNISolone (MEDROL) 4 MG dose pack; Take 6 tablets by mouth Daily for 1 day, THEN 5 tablets Daily for 1 day, THEN 4 tablets Daily for 1 day, THEN 3 tablets Daily for 1 day, THEN 2 tablets Daily for 1 day, THEN 1 tablet Daily for 1 day. Take as directed on package instructions.  Dispense: 21 tablet; Refill: 0  -     albuterol sulfate  (90 Base) MCG/ACT inhaler; 2 puffs QID prn  Dispense: 18 g; Refill: 1  -     azithromycin (Zithromax Z-Geovani) 250 MG tablet; Take 2 tablets by mouth on day 1, then 1 tablet daily on days 2-5  Dispense: 6 tablet; Refill: 0  -     Nirmatrelvir & Ritonavir, 300mg/100mg, (PAXLOVID) 20 x 150 MG & 10 x 100MG tablet therapy pack tablet; Take 3 tablets by mouth 2 (Two) Times a Day.  Dispense: 30 tablet; Refill: 0  -     promethazine-dextromethorphan (PROMETHAZINE-DM) 6.25-15 MG/5ML syrup; Take 5 mL by mouth 4  (Four) Times a Day As Needed for Cough.  Dispense: 473 mL; Refill: 0  -     Comprehensive Metabolic Panel; Future  -     Lipid Panel; Future  -     CBC & Differential; Future  -     Vitamin B12; Future  -     Folate; Future  -     Vitamin D,25-Hydroxy; Future  -     Magnesium; Future  -     Microalbumin / Creatinine Urine Ratio - Urine, Clean Catch; Future  -     Hemoglobin A1c; Future  -     TSH+Free T4; Future  -     T3, Free; Future    3. Wheezing  -     methylPREDNISolone (MEDROL) 4 MG dose pack; Take 6 tablets by mouth Daily for 1 day, THEN 5 tablets Daily for 1 day, THEN 4 tablets Daily for 1 day, THEN 3 tablets Daily for 1 day, THEN 2 tablets Daily for 1 day, THEN 1 tablet Daily for 1 day. Take as directed on package instructions.  Dispense: 21 tablet; Refill: 0  -     albuterol sulfate  (90 Base) MCG/ACT inhaler; 2 puffs QID prn  Dispense: 18 g; Refill: 1  -     azithromycin (Zithromax Z-Geovani) 250 MG tablet; Take 2 tablets by mouth on day 1, then 1 tablet daily on days 2-5  Dispense: 6 tablet; Refill: 0  -     Nirmatrelvir & Ritonavir, 300mg/100mg, (PAXLOVID) 20 x 150 MG & 10 x 100MG tablet therapy pack tablet; Take 3 tablets by mouth 2 (Two) Times a Day.  Dispense: 30 tablet; Refill: 0  -     promethazine-dextromethorphan (PROMETHAZINE-DM) 6.25-15 MG/5ML syrup; Take 5 mL by mouth 4 (Four) Times a Day As Needed for Cough.  Dispense: 473 mL; Refill: 0  -     Comprehensive Metabolic Panel; Future  -     Lipid Panel; Future  -     CBC & Differential; Future  -     Vitamin B12; Future  -     Folate; Future  -     Vitamin D,25-Hydroxy; Future  -     Magnesium; Future  -     Microalbumin / Creatinine Urine Ratio - Urine, Clean Catch; Future  -     Hemoglobin A1c; Future  -     TSH+Free T4; Future  -     T3, Free; Future    4. Prediabetes  -     Comprehensive Metabolic Panel; Future  -     Lipid Panel; Future  -     CBC & Differential; Future  -     Vitamin B12; Future  -     Folate; Future  -     Vitamin  D,25-Hydroxy; Future  -     Magnesium; Future  -     Microalbumin / Creatinine Urine Ratio - Urine, Clean Catch; Future  -     Hemoglobin A1c; Future  -     TSH+Free T4; Future  -     T3, Free; Future    5. Primary hypertension  -     Comprehensive Metabolic Panel; Future  -     Lipid Panel; Future  -     CBC & Differential; Future  -     Vitamin B12; Future  -     Folate; Future  -     Vitamin D,25-Hydroxy; Future  -     Magnesium; Future  -     Microalbumin / Creatinine Urine Ratio - Urine, Clean Catch; Future  -     Hemoglobin A1c; Future  -     TSH+Free T4; Future  -     T3, Free; Future    6. Vitamin D deficiency  -     Comprehensive Metabolic Panel; Future  -     Lipid Panel; Future  -     CBC & Differential; Future  -     Vitamin B12; Future  -     Folate; Future  -     Vitamin D,25-Hydroxy; Future  -     Magnesium; Future  -     Microalbumin / Creatinine Urine Ratio - Urine, Clean Catch; Future  -     Hemoglobin A1c; Future  -     TSH+Free T4; Future  -     T3, Free; Future      Due for physical in 4 to 6 weeks with labs prior  Patient Instructions   start Paxlovid as directed for 5 days side effects discussed patient not on any chronic medications  Use Medrol Dosepak Z-Geovani if sputum becomes more purulent discussed that this is for a bacterial infection  Albuterol inhaler as needed for shortness of air  Supportive treatment drink lots of fluids use promethazine-DM as directed as needed for cough caution may cause drowsiness recommend using at night  Per CDC recommendations mask and isolate for 5 days and then continue to mask for another 5 days  Patient states due to go on a trip to [place] in 3 weeks  Discussed possible continued post-COVID symptoms myalgias shortness of air chest pain after finished with the 10-day period  Patient needs to follow-up within the next 4 to 6 weeks for routine checkup and labs 1 week prior.     FOLLOW UP  Return in about 6 weeks (around 2/19/2024) for Recheck, Annual  physical.    Patient was given instructions and counseling regarding his condition or for health maintenance advice. Please see specific information pulled into the AVS if appropriate.

## 2024-01-05 NOTE — PATIENT INSTRUCTIONS
start Paxlovid as directed for 5 days side effects discussed patient not on any chronic medications  Use Medrol Dosepak Z-Geovani if sputum becomes more purulent discussed that this is for a bacterial infection  Albuterol inhaler as needed for shortness of air  Supportive treatment drink lots of fluids use promethazine-DM as directed as needed for cough caution may cause drowsiness recommend using at night  Per CDC recommendations mask and isolate for 5 days and then continue to mask for another 5 days  Patient states due to go on a trip to [place] in 3 weeks  Discussed possible continued post-COVID symptoms myalgias shortness of air chest pain after finished with the 10-day period  Patient needs to follow-up within the next 4 to 6 weeks for routine checkup and labs 1 week prior.

## 2024-07-10 ENCOUNTER — TELEPHONE (OUTPATIENT)
Dept: INTERNAL MEDICINE | Age: 59
End: 2024-07-10

## 2024-07-10 NOTE — TELEPHONE ENCOUNTER
Caller: Sherly Jo    Relationship: Self    Best call back number: [phone]     What orders are you requesting (i.e. lab or imaging): LABS    In what timeframe would the patient need to come in: PRIOR TO UPCOMING VISIT ON 7/25/24    Where will you receive your lab/imaging services: [facility]    Additional notes: PATIENT WOULD LIKE TO DISCUSS RESULTS AT UPCOMING APPOINTMENT. PLEASE CALL ONCE ORDERS HAVE BEEN ENTERED.

## 2024-07-10 NOTE — TELEPHONE ENCOUNTER
Patient only has lab orders pended for his one year appt. He doesn't need labs for his upcoming appt unless there is something specific he is wanting to be tested for.

## 2024-07-12 ENCOUNTER — LAB (OUTPATIENT)
Dept: INTERNAL MEDICINE | Age: 59
End: 2024-07-12
Payer: COMMERCIAL

## 2024-07-12 DIAGNOSIS — U07.1 COVID-19 VIRUS INFECTION: ICD-10-CM

## 2024-07-12 DIAGNOSIS — R73.03 PREDIABETES: ICD-10-CM

## 2024-07-12 DIAGNOSIS — I10 PRIMARY HYPERTENSION: ICD-10-CM

## 2024-07-12 DIAGNOSIS — R05.1 ACUTE COUGH: ICD-10-CM

## 2024-07-12 DIAGNOSIS — E55.9 VITAMIN D DEFICIENCY: ICD-10-CM

## 2024-07-12 DIAGNOSIS — R06.2 WHEEZING: ICD-10-CM

## 2024-07-12 LAB
25(OH)D3 SERPL-MCNC: 23.7 NG/ML (ref 30–100)
ALBUMIN SERPL-MCNC: 4.1 G/DL (ref 3.5–5.2)
ALBUMIN/GLOB SERPL: 1.2 G/DL
ALP SERPL-CCNC: 84 U/L (ref 39–117)
ALT SERPL W P-5'-P-CCNC: 49 U/L (ref 1–41)
ANION GAP SERPL CALCULATED.3IONS-SCNC: 9 MMOL/L (ref 5–15)
AST SERPL-CCNC: 51 U/L (ref 1–40)
BASOPHILS # BLD AUTO: 0.04 10*3/MM3 (ref 0–0.2)
BASOPHILS NFR BLD AUTO: 0.9 % (ref 0–1.5)
BILIRUB SERPL-MCNC: 1.2 MG/DL (ref 0–1.2)
BUN SERPL-MCNC: 14 MG/DL (ref 8–23)
BUN/CREAT SERPL: 14.9 (ref 7–25)
CALCIUM SPEC-SCNC: 9.6 MG/DL (ref 8.6–10.5)
CHLORIDE SERPL-SCNC: 105 MMOL/L (ref 98–107)
CHOLEST SERPL-MCNC: 147 MG/DL (ref 0–200)
CO2 SERPL-SCNC: 27 MMOL/L (ref 22–29)
CREAT SERPL-MCNC: 0.94 MG/DL (ref 0.76–1.27)
DEPRECATED RDW RBC AUTO: 42.3 FL (ref 37–54)
EGFRCR SERPLBLD CKD-EPI 2021: 92.2 ML/MIN/1.73
EOSINOPHIL # BLD AUTO: 0.16 10*3/MM3 (ref 0–0.4)
EOSINOPHIL NFR BLD AUTO: 3.4 % (ref 0.3–6.2)
ERYTHROCYTE [DISTWIDTH] IN BLOOD BY AUTOMATED COUNT: 12.1 % (ref 12.3–15.4)
FOLATE SERPL-MCNC: 10.9 NG/ML (ref 4.78–24.2)
GLOBULIN UR ELPH-MCNC: 3.5 GM/DL
GLUCOSE SERPL-MCNC: 117 MG/DL (ref 65–99)
HBA1C MFR BLD: 6 % (ref 4.8–5.6)
HCT VFR BLD AUTO: 44.3 % (ref 37.5–51)
HDLC SERPL-MCNC: 46 MG/DL (ref 40–60)
HGB BLD-MCNC: 14.8 G/DL (ref 13–17.7)
IMM GRANULOCYTES # BLD AUTO: 0.01 10*3/MM3 (ref 0–0.05)
IMM GRANULOCYTES NFR BLD AUTO: 0.2 % (ref 0–0.5)
LDLC SERPL CALC-MCNC: 87 MG/DL (ref 0–100)
LDLC/HDLC SERPL: 1.9 {RATIO}
LYMPHOCYTES # BLD AUTO: 1.02 10*3/MM3 (ref 0.7–3.1)
LYMPHOCYTES NFR BLD AUTO: 21.8 % (ref 19.6–45.3)
MAGNESIUM SERPL-MCNC: 2.4 MG/DL (ref 1.6–2.4)
MCH RBC QN AUTO: 32.2 PG (ref 26.6–33)
MCHC RBC AUTO-ENTMCNC: 33.4 G/DL (ref 31.5–35.7)
MCV RBC AUTO: 96.3 FL (ref 79–97)
MONOCYTES # BLD AUTO: 0.49 10*3/MM3 (ref 0.1–0.9)
MONOCYTES NFR BLD AUTO: 10.5 % (ref 5–12)
NEUTROPHILS NFR BLD AUTO: 2.96 10*3/MM3 (ref 1.7–7)
NEUTROPHILS NFR BLD AUTO: 63.2 % (ref 42.7–76)
PLATELET # BLD AUTO: 117 10*3/MM3 (ref 140–450)
PMV BLD AUTO: 11.7 FL (ref 6–12)
POTASSIUM SERPL-SCNC: 4.6 MMOL/L (ref 3.5–5.2)
PROT SERPL-MCNC: 7.6 G/DL (ref 6–8.5)
RBC # BLD AUTO: 4.6 10*6/MM3 (ref 4.14–5.8)
SODIUM SERPL-SCNC: 141 MMOL/L (ref 136–145)
T3FREE SERPL-MCNC: 3.6 PG/ML (ref 2–4.4)
T4 FREE SERPL-MCNC: 1.16 NG/DL (ref 0.92–1.68)
TRIGL SERPL-MCNC: 68 MG/DL (ref 0–150)
TSH SERPL DL<=0.05 MIU/L-ACNC: 3.99 UIU/ML (ref 0.27–4.2)
VIT B12 BLD-MCNC: 354 PG/ML (ref 211–946)
VLDLC SERPL-MCNC: 14 MG/DL (ref 5–40)
WBC NRBC COR # BLD AUTO: 4.68 10*3/MM3 (ref 3.4–10.8)

## 2024-07-12 PROCEDURE — 82746 ASSAY OF FOLIC ACID SERUM: CPT | Performed by: INTERNAL MEDICINE

## 2024-07-12 PROCEDURE — 83036 HEMOGLOBIN GLYCOSYLATED A1C: CPT | Performed by: INTERNAL MEDICINE

## 2024-07-12 PROCEDURE — 36415 COLL VENOUS BLD VENIPUNCTURE: CPT | Performed by: INTERNAL MEDICINE

## 2024-07-12 PROCEDURE — 80050 GENERAL HEALTH PANEL: CPT | Performed by: INTERNAL MEDICINE

## 2024-07-12 PROCEDURE — 82306 VITAMIN D 25 HYDROXY: CPT | Performed by: INTERNAL MEDICINE

## 2024-07-12 PROCEDURE — 83735 ASSAY OF MAGNESIUM: CPT | Performed by: INTERNAL MEDICINE

## 2024-07-12 PROCEDURE — 80061 LIPID PANEL: CPT | Performed by: INTERNAL MEDICINE

## 2024-07-12 PROCEDURE — 84439 ASSAY OF FREE THYROXINE: CPT | Performed by: INTERNAL MEDICINE

## 2024-07-12 PROCEDURE — 82607 VITAMIN B-12: CPT | Performed by: INTERNAL MEDICINE

## 2024-07-12 PROCEDURE — 84481 FREE ASSAY (FT-3): CPT | Performed by: INTERNAL MEDICINE

## 2024-07-25 ENCOUNTER — OFFICE VISIT (OUTPATIENT)
Dept: INTERNAL MEDICINE | Age: 59
End: 2024-07-25
Payer: COMMERCIAL

## 2024-07-25 VITALS
HEIGHT: 73 IN | SYSTOLIC BLOOD PRESSURE: 120 MMHG | HEART RATE: 76 BPM | DIASTOLIC BLOOD PRESSURE: 75 MMHG | OXYGEN SATURATION: 97 % | WEIGHT: 262.4 LBS | BODY MASS INDEX: 34.78 KG/M2 | TEMPERATURE: 99.1 F

## 2024-07-25 DIAGNOSIS — Z29.11 NEED FOR RSV VACCINATION: ICD-10-CM

## 2024-07-25 DIAGNOSIS — Z23 NEED FOR SHINGLES VACCINE: ICD-10-CM

## 2024-07-25 DIAGNOSIS — Z00.00 ROUTINE HISTORY AND PHYSICAL EXAMINATION OF ADULT: Primary | ICD-10-CM

## 2024-07-25 DIAGNOSIS — D22.9 ATYPICAL NEVI: ICD-10-CM

## 2024-07-25 DIAGNOSIS — N40.1 BPH WITH OBSTRUCTION/LOWER URINARY TRACT SYMPTOMS: ICD-10-CM

## 2024-07-25 DIAGNOSIS — G47.33 OSA (OBSTRUCTIVE SLEEP APNEA): ICD-10-CM

## 2024-07-25 DIAGNOSIS — E55.9 VITAMIN D DEFICIENCY: ICD-10-CM

## 2024-07-25 DIAGNOSIS — E66.9 OBESITY (BMI 30-39.9): ICD-10-CM

## 2024-07-25 DIAGNOSIS — Z12.11 SCREEN FOR COLON CANCER: ICD-10-CM

## 2024-07-25 DIAGNOSIS — N13.8 BPH WITH OBSTRUCTION/LOWER URINARY TRACT SYMPTOMS: ICD-10-CM

## 2024-07-25 DIAGNOSIS — L30.9 DERMATITIS: ICD-10-CM

## 2024-07-25 DIAGNOSIS — N42.9 PROSTATE DISORDER: ICD-10-CM

## 2024-07-25 PROCEDURE — 90750 HZV VACC RECOMBINANT IM: CPT | Performed by: INTERNAL MEDICINE

## 2024-07-25 PROCEDURE — 99214 OFFICE O/P EST MOD 30 MIN: CPT | Performed by: INTERNAL MEDICINE

## 2024-07-25 PROCEDURE — 90471 IMMUNIZATION ADMIN: CPT | Performed by: INTERNAL MEDICINE

## 2024-07-25 RX ORDER — ERGOCALCIFEROL 1.25 MG/1
50000 CAPSULE ORAL WEEKLY
Qty: 12 CAPSULE | Refills: 0 | Status: SHIPPED | OUTPATIENT
Start: 2024-07-25

## 2024-07-25 RX ORDER — METHYLPREDNISOLONE 4 MG/1
TABLET ORAL
Qty: 21 TABLET | Refills: 0 | Status: SHIPPED | OUTPATIENT
Start: 2024-07-25 | End: 2024-07-30

## 2024-07-25 RX ORDER — TRIAMCINOLONE ACETONIDE 1 MG/G
1 CREAM TOPICAL 2 TIMES DAILY
Qty: 28.4 G | Refills: 0 | Status: SHIPPED | OUTPATIENT
Start: 2024-07-25

## 2024-07-25 RX ORDER — SEMAGLUTIDE 0.25 MG/.5ML
0.25 INJECTION, SOLUTION SUBCUTANEOUS WEEKLY
Qty: 2 ML | Refills: 0 | Status: SHIPPED | OUTPATIENT
Start: 2024-07-25

## 2024-07-25 RX ORDER — TAMSULOSIN HYDROCHLORIDE 0.4 MG/1
1 CAPSULE ORAL DAILY
Qty: 30 CAPSULE | Refills: 2 | Status: SHIPPED | OUTPATIENT
Start: 2024-07-25

## 2024-07-25 RX ORDER — SEMAGLUTIDE 0.5 MG/.5ML
0.5 INJECTION, SOLUTION SUBCUTANEOUS WEEKLY
Qty: 2 ML | Refills: 0 | Status: SHIPPED | OUTPATIENT
Start: 2024-08-25

## 2024-07-25 NOTE — PATIENT INSTRUCTIONS
Rec RSV and Shingrix vaccines  Refer to Derm for the volume left forehead  Try Medrol Dosepak and steroid cream to areas  Try Wegovy if insurance and work provides 0.25 mg every week for 1 month and then go up to 2.5 mg every week  Cut back on portions and follow a low-carb diet

## 2024-07-25 NOTE — PROGRESS NOTES
CHIEF COMPLAINT  Sherly Jo presents to [facility] INTERNAL MEDICINE for follow-up of Annual Exam (Pt is a 59 yr old male presenting to Internal Medicine for an annual physical; Pt reports several concerns and/or complaints. ).    HPI  59-year-old male here for annual physical    Records reviewed, meds reviewed in detail, labs reviewed with patient.  Plan of care is as follows below.      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 Take as directed on package instructions., Disp: 21 tablet, Rfl: 0    RSVPreF3 Vac Recomb Adjuvanted (AREXVY) 120 MCG/0.5ML reconstituted suspension injection, Inject 0.5 mL into the appropriate muscle as directed by prescriber 1 (One) Time for 1 dose., Disp: 0.5 mL, Rfl: 0    Semaglutide-Weight Management (Wegovy) 0.25 MG/0.5ML solution auto-injector, Inject 0.5 mL under the skin into the appropriate area as directed 1 (One) Time Per Week. For 4 weeks, Disp: 2 mL, Rfl: 0    [START ON 8/25/2024] Semaglutide-Weight Management (Wegovy) 0.5 MG/0.5ML solution auto-injector, Inject 0.5 mL under the skin into the appropriate area as directed 1 (One) Time Per Week. For 4 weeks, Disp: 2 mL,  "Rfl: 0    tamsulosin (FLOMAX) 0.4 MG capsule 24 hr capsule, Take 1 capsule by mouth Daily., Disp: 30 capsule, Rfl: 2    triamcinolone (KENALOG) 0.1 % cream, Apply 1 Application topically to the appropriate area as directed 2 (Two) Times a Day., Disp: 28.4 g, Rfl: 0    vitamin D (ERGOCALCIFEROL) 1.25 MG (83757 UT) capsule capsule, Take 1 capsule by mouth 1 (One) Time Per Week., Disp: 12 capsule, Rfl: 0   PFSH reviewed.      OBJECTIVE  Vital Signs  Vitals:    07/25/24 1408   BP: 120/75   BP Location: Right arm   Patient Position: Sitting   Cuff Size: Adult   Pulse: 76   Temp: 99.1 °F (37.3 °C)   TempSrc: Infrared   SpO2: 97%   Weight: 119 kg (262 lb 6.4 oz)   Height: 185.4 cm (72.99\")      Body mass index is 34.63 kg/m².    Physical Exam  Vitals and nursing note reviewed.   Constitutional:       Appearance: Normal appearance.   HENT:      Head: Normocephalic and atraumatic.      Nose: Nose normal.   Eyes:      Extraocular Movements: Extraocular movements intact.      Pupils: Pupils are equal, round, and reactive to light.   Cardiovascular:      Rate and Rhythm: Normal rate and regular rhythm.   Pulmonary:      Effort: Pulmonary effort is normal.      Breath sounds: Normal breath sounds.   Abdominal:      General: Abdomen is flat.      Palpations: Abdomen is soft.   Musculoskeletal:      Cervical back: Normal range of motion and neck supple.   Skin:     General: Skin is warm and dry.      Comments: Crusted lesions between fingers and on top of right hand   Neurological:      General: No focal deficit present.      Mental Status: He is alert and oriented to person, place, and time.   Psychiatric:         Mood and Affect: Mood normal.         Behavior: Behavior normal.          RESULTS REVIEW  No results found for: \"PROBNP\", \"BNP\"  CMP          7/12/2024    08:16   CMP   Glucose 117    BUN 14    Creatinine 0.94    EGFR 92.2    Sodium 141    Potassium 4.6    Chloride 105    Calcium 9.6    Total Protein 7.6    Albumin 4.1  "   Globulin 3.5    Total Bilirubin 1.2    Alkaline Phosphatase 84    AST (SGOT) 51    ALT (SGPT) 49    Albumin/Globulin Ratio 1.2    BUN/Creatinine Ratio 14.9    Anion Gap 9.0      CBC w/diff          7/12/2024    08:16   CBC w/Diff   WBC 4.68    RBC 4.60    Hemoglobin 14.8    Hematocrit 44.3    MCV 96.3    MCH 32.2    MCHC 33.4    RDW 12.1    Platelets 117    Neutrophil Rel % 63.2    Immature Granulocyte Rel % 0.2    Lymphocyte Rel % 21.8    Monocyte Rel % 10.5    Eosinophil Rel % 3.4    Basophil Rel % 0.9       Lipid Panel          7/12/2024    08:16   Lipid Panel   Total Cholesterol 147    Triglycerides 68    HDL Cholesterol 46    VLDL Cholesterol 14    LDL Cholesterol  87    LDL/HDL Ratio 1.90       Lab Results   Component Value Date    TSH 3.990 07/12/2024    TSH 4.310 (H) 03/31/2022    TSH 4.280 (H) 05/27/2021      Lab Results   Component Value Date    FREET4 1.16 07/12/2024    FREET4 1.18 03/31/2022    FREET4 1.2 05/27/2021      A1C Last 3 Results          7/12/2024    08:16   HGBA1C Last 3 Results   Hemoglobin A1C 6.00       Lab Results   Component Value Date    HCPPMBCR57 354 07/12/2024    NRIR93IQ 23.7 (L) 07/12/2024    MG 2.4 07/12/2024        No Images in the past 120 days found..             ASSESSMENT & PLAN  Diagnoses and all orders for this visit:    1. Routine history and physical examination of adult (Primary)  Assessment & Plan:  Ages 40-64 Counseling/Anticipatory Guidance Discussed: nutrition, physical activity, healthy weight, injury prevention, misuse of tobacco, alcohol and drugs, sexual behavior and STDs, contraception, dental health, mental health, immunizations, screenings, and use of seatbelt    Orders:  -     Shingrix Vaccine  -     methylPREDNISolone (MEDROL) 4 MG dose pack; Take 6 tablets by mouth Daily for 1 day, THEN 5 tablets Daily for 1 day, THEN 4 tablets Daily for 1 day, THEN 3 tablets Daily for 1 day, THEN 2 tablets Daily for 1 day, THEN 1 tablet Daily for 1 day. Take as directed  on package instructions.  Dispense: 21 tablet; Refill: 0  -     triamcinolone (KENALOG) 0.1 % cream; Apply 1 Application topically to the appropriate area as directed 2 (Two) Times a Day.  Dispense: 28.4 g; Refill: 0  -     Semaglutide-Weight Management (Wegovy) 0.25 MG/0.5ML solution auto-injector; Inject 0.5 mL under the skin into the appropriate area as directed 1 (One) Time Per Week. For 4 weeks  Dispense: 2 mL; Refill: 0  -     Semaglutide-Weight Management (Wegovy) 0.5 MG/0.5ML solution auto-injector; Inject 0.5 mL under the skin into the appropriate area as directed 1 (One) Time Per Week. For 4 weeks  Dispense: 2 mL; Refill: 0    2. Dermatitis  Comments:  Rule out poison ivy/oak contact dermatitis-will give steroid Dosepak, triamcinolone cream twice daily as needed can use Benadryl as needed for itching  Orders:  -     methylPREDNISolone (MEDROL) 4 MG dose pack; Take 6 tablets by mouth Daily for 1 day, THEN 5 tablets Daily for 1 day, THEN 4 tablets Daily for 1 day, THEN 3 tablets Daily for 1 day, THEN 2 tablets Daily for 1 day, THEN 1 tablet Daily for 1 day. Take as directed on package instructions.  Dispense: 21 tablet; Refill: 0  -     triamcinolone (KENALOG) 0.1 % cream; Apply 1 Application topically to the appropriate area as directed 2 (Two) Times a Day.  Dispense: 28.4 g; Refill: 0    3. Obesity (BMI 30-39.9)  Assessment & Plan:  Patient's (Body mass index is 34.63 kg/m².) indicates that they are obese (BMI >30) with health conditions that include obstructive sleep apnea and osteoarthritis . Weight is worsening. BMI  is above average; BMI management plan is completed. We discussed low calorie, low carb based diet program, portion control, increasing exercise, and pharmacologic options including try Wegovy .     Patient needs to lose 40 pounds before can get the inspire for ELAN per ENT. Will try Wegovy 1.25 mg every week x 4 then 0.5 mg every week. SE-N, V, diarrhea. Cut back on portions and exercise      Orders:  -     Semaglutide-Weight Management (Wegovy) 0.25 MG/0.5ML solution auto-injector; Inject 0.5 mL under the skin into the appropriate area as directed 1 (One) Time Per Week. For 4 weeks  Dispense: 2 mL; Refill: 0  -     Semaglutide-Weight Management (Wegovy) 0.5 MG/0.5ML solution auto-injector; Inject 0.5 mL under the skin into the appropriate area as directed 1 (One) Time Per Week. For 4 weeks  Dispense: 2 mL; Refill: 0    4. 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  Dispense: 30 capsule; Refill: 2    6. Vitamin D deficiency  Comments:  Vitamin D in the 20s will give high-dose vitamin D for 3 months then may need maintenance 1000 units daily  Orders:  -     vitamin D (ERGOCALCIFEROL) 1.25 MG (19434 UT) capsule capsule; Take 1 capsule by mouth 1 (One) Time Per Week.  Dispense: 12 capsule; Refill: 0    7. Prostate disorder    8. ELAN (obstructive sleep apnea)  -     Semaglutide-Weight Management (Wegovy) 0.25 MG/0.5ML solution auto-injector; Inject 0.5 mL under the skin into the appropriate area as directed 1 (One) Time Per Week. For 4 weeks  Dispense: 2 mL; Refill: 0  -     Semaglutide-Weight Management (Wegovy) 0.5 MG/0.5ML solution auto-injector; Inject 0.5 mL under the skin into the appropriate area as directed 1 (One) Time Per Week. For 4 weeks  Dispense: 2 mL; Refill: 0    9. Need for RSV vaccination  -     RSVPreF3 Vac Recomb Adjuvanted (AREXVY) 120 MCG/0.5ML reconstituted suspension injection; Inject 0.5 mL into the appropriate muscle as directed by  prescriber 1 (One) Time for 1 dose.  Dispense: 0.5 mL; Refill: 0    10. Screen for colon cancer  Comments:  Last colonoscopy by Dr. Ramos 2015 needs repeat  Orders:  -     Ambulatory Referral For Screening Colonoscopy    11. Need for shingles vaccine  -     Shingrix Vaccine         BMI is >= 30 and <35. (Class 1 Obesity). The following options were offered after discussion;: weight loss educational material (shared in after visit summary), exercise counseling/recommendations, and nutrition counseling/recommendations       Patient Instructions   Rec RSV and Shingrix vaccines  Refer to Derm for the volume left forehead  Try Medrol Dosepak and steroid cream to areas  Try Wegovy if insurance and work provides 0.25 mg every week for 1 month and then go up to 2.5 mg every week  Cut back on portions and follow a low-carb diet     FOLLOW UP  Return in about 2 months (around 9/25/2024) for Recheck.  Check Wegovy may need PSA    Patient was given instructions and counseling regarding his condition or for health maintenance advice. Please see specific information pulled into the AVS if appropriate.

## 2024-07-25 NOTE — ASSESSMENT & PLAN NOTE
Patient's (Body mass index is 34.63 kg/m².) indicates that they are obese (BMI >30) with health conditions that include obstructive sleep apnea and osteoarthritis . Weight is worsening. BMI  is above average; BMI management plan is completed. We discussed low calorie, low carb based diet program, portion control, increasing exercise, and pharmacologic options including try Wegovy .     Patient needs to lose 40 pounds before can get the inspire for ELAN per ENT. Will try Wegovy 1.25 mg every week x 4 then 0.5 mg every week. SE-N, V, diarrhea. Cut back on portions and exercise

## 2024-09-16 ENCOUNTER — TELEPHONE (OUTPATIENT)
Dept: INTERNAL MEDICINE | Age: 59
End: 2024-09-16

## 2024-09-16 NOTE — TELEPHONE ENCOUNTER
Caller: Sherly Jo    Relationship: Self    Best call back number: [phone]     Requested Prescriptions:   NEXT LEVEL OF [facility]     Pharmacy where request should be sent: [id number] - [place], [place] - [address] AT [facility] [phone] [facility] [phone] FX     Last office visit with prescribing clinician: 7/25/2024   Last telemedicine visit with prescribing clinician: Visit date not found   Next office visit with prescribing clinician: 9/26/2024     Additional details provided by patient: PATIENT CURRENTLY ON .5    Does the patient have less than a 3 day supply:  [x] Yes  [] No    Would you like a call back once the refill request has been completed: [x] Yes [] No    If the office needs to give you a call back, can they leave a voicemail: [x] Yes [] No    Peter Mariee Rep   09/16/24 10:59 EDT

## 2024-09-17 ENCOUNTER — TELEPHONE (OUTPATIENT)
Dept: INTERNAL MEDICINE | Age: 59
End: 2024-09-17
Payer: COMMERCIAL

## 2024-09-17 DIAGNOSIS — E66.9 OBESITY (BMI 30-39.9): Primary | ICD-10-CM

## 2024-09-17 RX ORDER — SEMAGLUTIDE 1 MG/.5ML
1 INJECTION, SOLUTION SUBCUTANEOUS WEEKLY
Qty: 2 ML | Refills: 2 | Status: SHIPPED | OUTPATIENT
Start: 2024-09-17

## 2024-09-26 ENCOUNTER — OFFICE VISIT (OUTPATIENT)
Dept: INTERNAL MEDICINE | Age: 59
End: 2024-09-26
Payer: COMMERCIAL

## 2024-09-26 VITALS
HEART RATE: 60 BPM | BODY MASS INDEX: 33.24 KG/M2 | SYSTOLIC BLOOD PRESSURE: 132 MMHG | TEMPERATURE: 99.3 F | RESPIRATION RATE: 18 BRPM | WEIGHT: 250.8 LBS | OXYGEN SATURATION: 96 % | DIASTOLIC BLOOD PRESSURE: 70 MMHG | HEIGHT: 73 IN

## 2024-09-26 DIAGNOSIS — G47.33 OSA (OBSTRUCTIVE SLEEP APNEA): ICD-10-CM

## 2024-09-26 DIAGNOSIS — E55.9 VITAMIN D DEFICIENCY: ICD-10-CM

## 2024-09-26 DIAGNOSIS — Z13.220 ENCOUNTER FOR LIPID SCREENING FOR CARDIOVASCULAR DISEASE: ICD-10-CM

## 2024-09-26 DIAGNOSIS — E66.9 OBESITY (BMI 30-39.9): Primary | ICD-10-CM

## 2024-09-26 DIAGNOSIS — Z85.828 HX OF SKIN CANCER, BASAL CELL: ICD-10-CM

## 2024-09-26 DIAGNOSIS — Z13.6 ENCOUNTER FOR LIPID SCREENING FOR CARDIOVASCULAR DISEASE: ICD-10-CM

## 2024-09-26 DIAGNOSIS — N40.1 BPH WITH OBSTRUCTION/LOWER URINARY TRACT SYMPTOMS: ICD-10-CM

## 2024-09-26 DIAGNOSIS — N13.8 BPH WITH OBSTRUCTION/LOWER URINARY TRACT SYMPTOMS: ICD-10-CM

## 2024-09-26 DIAGNOSIS — R11.0 NAUSEA: ICD-10-CM

## 2024-09-26 DIAGNOSIS — R73.03 PREDIABETES: ICD-10-CM

## 2024-09-26 DIAGNOSIS — Z23 NEED FOR INFLUENZA VACCINATION: ICD-10-CM

## 2024-09-26 DIAGNOSIS — Z23 NEED FOR SHINGLES VACCINE: ICD-10-CM

## 2024-09-26 PROCEDURE — 99214 OFFICE O/P EST MOD 30 MIN: CPT | Performed by: INTERNAL MEDICINE

## 2024-09-26 PROCEDURE — 90750 HZV VACC RECOMBINANT IM: CPT | Performed by: INTERNAL MEDICINE

## 2024-09-26 PROCEDURE — 90656 IIV3 VACC NO PRSV 0.5 ML IM: CPT | Performed by: INTERNAL MEDICINE

## 2024-09-26 PROCEDURE — 90472 IMMUNIZATION ADMIN EACH ADD: CPT | Performed by: INTERNAL MEDICINE

## 2024-09-26 PROCEDURE — 90471 IMMUNIZATION ADMIN: CPT | Performed by: INTERNAL MEDICINE

## 2024-09-26 RX ORDER — ERGOCALCIFEROL 1.25 MG/1
CAPSULE, LIQUID FILLED ORAL
Qty: 3 CAPSULE | Refills: 1 | Status: SHIPPED | OUTPATIENT
Start: 2024-09-26

## 2024-09-26 RX ORDER — ONDANSETRON 4 MG/1
4 TABLET, FILM COATED ORAL EVERY 8 HOURS PRN
Qty: 30 TABLET | Refills: 1 | Status: SHIPPED | OUTPATIENT
Start: 2024-09-26

## 2024-10-15 ENCOUNTER — TELEPHONE (OUTPATIENT)
Dept: INTERNAL MEDICINE | Age: 59
End: 2024-10-15
Payer: COMMERCIAL

## 2024-10-15 DIAGNOSIS — E66.9 OBESITY (BMI 30-39.9): ICD-10-CM

## 2024-10-15 RX ORDER — SEMAGLUTIDE 1 MG/.5ML
1 INJECTION, SOLUTION SUBCUTANEOUS WEEKLY
Qty: 2 ML | Refills: 2 | Status: CANCELLED | OUTPATIENT
Start: 2024-10-15

## 2024-10-15 RX ORDER — SEMAGLUTIDE 1.7 MG/.75ML
0.75 INJECTION, SOLUTION SUBCUTANEOUS WEEKLY
Qty: 3 ML | Refills: 5 | Status: SHIPPED | OUTPATIENT
Start: 2024-10-15 | End: 2024-10-16

## 2024-10-15 NOTE — TELEPHONE ENCOUNTER
Caller: Sherly Jo    Relationship: Self    Best call back number: [phone]     What medication are you requesting: WEGOVY NEXT DOSE UP         If a prescription is needed, what is your preferred pharmacy and phone number: [facility] #[id number] - [place], [place] - [address] AT [facility] [phone] [facility] [phone] FX     Additional notes:PATIENT HAS 2 INJECTIONS LEFT AND WOULD LIKE THE NEXT DOSE UP SENT TO THE PHARMACY.

## 2024-10-16 ENCOUNTER — TELEPHONE (OUTPATIENT)
Dept: INTERNAL MEDICINE | Age: 59
End: 2024-10-16
Payer: COMMERCIAL

## 2024-10-16 RX ORDER — SEMAGLUTIDE 1.7 MG/.75ML
1.7 INJECTION, SOLUTION SUBCUTANEOUS WEEKLY
Qty: 3 ML | Refills: 5 | Status: SHIPPED | OUTPATIENT
Start: 2024-10-16

## 2024-10-16 NOTE — TELEPHONE ENCOUNTER
Pharmacy Name:  [facility]    Pharmacy representative name: MICHAEL    Pharmacy representative phone number: [phone]    What medication are you calling in regards to: WEGOVY    What question does the pharmacy have: THE PEN IS 0.75ML BUT THE DIRECTIONS SAY GIVE 0.33 ML BUT PEN CANNOT BE BROKEN.    PLEASE CLARIFY DIRECTIONS.    Who is the provider that prescribed the medication: DR. SCHUMACHER

## 2024-10-20 DIAGNOSIS — N13.8 BPH WITH OBSTRUCTION/LOWER URINARY TRACT SYMPTOMS: ICD-10-CM

## 2024-10-20 DIAGNOSIS — N40.1 BPH WITH OBSTRUCTION/LOWER URINARY TRACT SYMPTOMS: ICD-10-CM

## 2024-10-21 RX ORDER — TAMSULOSIN HYDROCHLORIDE 0.4 MG/1
1 CAPSULE ORAL DAILY
Qty: 30 CAPSULE | Refills: 2 | Status: SHIPPED | OUTPATIENT
Start: 2024-10-21

## 2024-10-25 ENCOUNTER — TELEPHONE (OUTPATIENT)
Dept: INTERNAL MEDICINE | Age: 59
End: 2024-10-25
Payer: COMMERCIAL

## 2024-10-25 RX ORDER — SEMAGLUTIDE 1.7 MG/.75ML
1.7 INJECTION, SOLUTION SUBCUTANEOUS WEEKLY
Qty: 3 ML | Refills: 5 | Status: SHIPPED | OUTPATIENT
Start: 2024-10-25

## 2024-10-25 NOTE — TELEPHONE ENCOUNTER
Caller: Sherly Jo    Relationship: Self    Best call back number: [phone]    Requested Prescriptions:   Requested Prescriptions     Pending Prescriptions Disp Refills    Semaglutide-Weight Management (Wegovy) 1.7 MG/0.75ML solution auto-injector 3 mL 5     Sig: Inject 0.75 mL under the skin into the appropriate area as directed 1 (One) Time Per Week. For 4 weeks        Pharmacy where request should be sent: [facility] #[id number] - [place], [place] - [address] [facility] - [phone] [facility] [phone] FX     Last office visit with prescribing clinician: 9/26/2024   Last telemedicine visit with prescribing clinician: Visit date not found   Next office visit with prescribing clinician: 1/16/2025     Additional details provided by patient: PATIENT STATED HE NEEDS TO HAVE THIS REFILL CANCELED AT [facility]. THEY ARE GIVING HIM THE RUN AROUND IF THEY HAVE IT IN STOCK OR NOT. HE CALLED [facility] AND THEY CAN GET IT IN THE NEXT DAY. HE IS ON HIS LAST SHOT AND WILL BE LEAVING FOR OUT OF TOWN SOON. SO HE NEEDS THIS SENT IN AS SOON AS POSSIBLE     Does the patient have less than a 3 day supply:  [x] Yes  [] No      Peter King Rep   10/25/24 08:25 EDT

## 2024-12-25 DIAGNOSIS — N13.8 BPH WITH OBSTRUCTION/LOWER URINARY TRACT SYMPTOMS: ICD-10-CM

## 2024-12-25 DIAGNOSIS — N40.1 BPH WITH OBSTRUCTION/LOWER URINARY TRACT SYMPTOMS: ICD-10-CM

## 2024-12-26 RX ORDER — TAMSULOSIN HYDROCHLORIDE 0.4 MG/1
1 CAPSULE ORAL DAILY
Qty: 30 CAPSULE | Refills: 2 | Status: SHIPPED | OUTPATIENT
Start: 2024-12-26

## 2025-01-15 ENCOUNTER — TELEPHONE (OUTPATIENT)
Dept: INTERNAL MEDICINE | Age: 60
End: 2025-01-15
Payer: COMMERCIAL

## 2025-01-15 PROBLEM — R74.8 ELEVATED LIVER ENZYMES: Status: ACTIVE | Noted: 2025-01-15

## 2025-01-15 NOTE — PROGRESS NOTES
"CHIEF COMPLAINT  Sherly Jo presents to [facility] for follow-up of Primary Care Follow-Up (4 month follow up on obesity. Pt states he got a new tattoo and now has a rash on it).    HPI  59-year-old patient with prediabetes, obesity on wegovy, elev LFT here for routine 4 month follow-up    Main concern is a rash on a new tattoo right upper arm-that he just got in 10/2024 and retouched 12/2024 and ow itchy with rash past 6 weeks    F/U obesity -on wegovy   Obesity  262 pounds-7/25/2024-started Wegovy 0.25   250 pounds-9/26/2024-on Wegovy 0.5   234 pounds-1/16/2025-on Wegovy 1.7    Records reviewed, meds reviewed in detail, labs reviewed with patient.  Plan of care is as follows below.    PMFSH-Reviewed  ROS-no nausea    SH- dad at [facility]-85 yo -liver problems-pt not able to exercise      Current Outpatient Medications:     tamsulosin (FLOMAX) 0.4 MG capsule 24 hr capsule, TAKE 1 CAPSULE BY MOUTH DAILY, Disp: 30 capsule, Rfl: 2    triamcinolone (KENALOG) 0.1 % cream, Apply 1 Application topically to the appropriate area as directed 2 (Two) Times a Day., Disp: 28.4 g, Rfl: 0    vitamin D (ERGOCALCIFEROL) 1.25 MG (06861 UT) capsule capsule, On e capsule every month, Disp: 3 capsule, Rfl: 1    Semaglutide-Weight Management (Wegovy) 2.4 MG/0.75ML solution auto-injector, Inject 2.4 mg under the skin into the appropriate area as directed 1 (One) Time Per Week. For 4 weeks, Disp: 3 mL, Rfl: 3    triamcinolone (KENALOG) 0.1 % cream, Apply 1 Application topically to the appropriate area as directed 2 (Two) Times a Day., Disp: 45 g, Rfl: 0   PFSH reviewed.      OBJECTIVE  Vital Signs  Vitals:    01/16/25 0858   BP: 110/58   BP Location: Left arm   Patient Position: Sitting   Cuff Size: Large Adult   Pulse: 62   Resp: 18   Temp: 98.6 °F (37 °C)   TempSrc: Temporal   SpO2: 96%   Weight: 106 kg (234 lb)   Height: 185.4 cm (72.99\")      Body mass index is 30.88 " "kg/m².    Physical Exam  Vitals and nursing note reviewed.   Constitutional:       Appearance: Normal appearance. He is obese.   HENT:      Head: Normocephalic and atraumatic.      Nose: Nose normal.   Eyes:      Extraocular Movements: Extraocular movements intact.      Pupils: Pupils are equal, round, and reactive to light.   Cardiovascular:      Rate and Rhythm: Normal rate and regular rhythm.   Pulmonary:      Effort: Pulmonary effort is normal.      Breath sounds: Normal breath sounds.   Abdominal:      General: Abdomen is flat.      Palpations: Abdomen is soft.   Musculoskeletal:      Cervical back: Normal range of motion and neck supple.   Skin:     General: Skin is warm and dry.   Neurological:      General: No focal deficit present.      Mental Status: He is alert and oriented to person, place, and time.   Psychiatric:         Mood and Affect: Mood normal.         Behavior: Behavior normal.        RESULTS REVIEW  No results found for: \"PROBNP\", \"BNP\"  CMP          7/12/2024    08:16   CMP   Glucose 117    BUN 14    Creatinine 0.94    EGFR 92.2    Sodium 141    Potassium 4.6    Chloride 105    Calcium 9.6    Total Protein 7.6    Albumin 4.1    Globulin 3.5    Total Bilirubin 1.2    Alkaline Phosphatase 84    AST (SGOT) 51    ALT (SGPT) 49    Albumin/Globulin Ratio 1.2    BUN/Creatinine Ratio 14.9    Anion Gap 9.0      CBC w/diff          7/12/2024    08:16   CBC w/Diff   WBC 4.68    RBC 4.60    Hemoglobin 14.8    Hematocrit 44.3    MCV 96.3    MCH 32.2    MCHC 33.4    RDW 12.1    Platelets 117    Neutrophil Rel % 63.2    Immature Granulocyte Rel % 0.2    Lymphocyte Rel % 21.8    Monocyte Rel % 10.5    Eosinophil Rel % 3.4    Basophil Rel % 0.9       Lipid Panel          7/12/2024    08:16   Lipid Panel   Total Cholesterol 147    Triglycerides 68    HDL Cholesterol 46    VLDL Cholesterol 14    LDL Cholesterol  87    LDL/HDL Ratio 1.90       Lab Results   Component Value Date    TSH 3.990 07/12/2024    TSH " 4.310 (H) 03/31/2022    TSH 4.280 (H) 05/27/2021      Lab Results   Component Value Date    FREET4 1.16 07/12/2024    FREET4 1.18 03/31/2022    FREET4 1.2 05/27/2021      A1C Last 3 Results          7/12/2024    08:16   HGBA1C Last 3 Results   Hemoglobin A1C 6.00       Lab Results   Component Value Date    YGVQFZXA32 354 07/12/2024    IRFJ71ZY 23.7 (L) 07/12/2024    MG 2.4 07/12/2024        No Images in the past 120 days found..             ASSESSMENT & PLAN  Diagnoses and all orders for this visit:    1. Morbid obesity (Primary)  Assessment & Plan:  Patient's (Body mass index is 30.88 kg/m².) indicates that they are obese (BMI >30) with health conditions that include obstructive sleep apnea, impaired fasting glucose, dyslipidemias, and osteoarthritis . Weight is improving with treatment. BMI  is above average; BMI management plan is completed. We discussed low calorie, low carb based diet program, portion control, increasing exercise, and pharmacologic options including Wegovy .    Patient needs to lose 40 pounds before can get the inspire for ELAN per ENT.  Goal weight is 235 pounds  262 pounds-7/25/2024-started Wegovy 0.25   250 pounds-9/26/2024-on Wegovy 0.5   234 pounds-1/16/2025-on Wegovy 1.7     Plan-Currently tolerating the 1.7 mg dose for the past month no nausea  Increase Wegovy to 2 to 4 mg every week    Orders:  -     Semaglutide-Weight Management (Wegovy) 2.4 MG/0.75ML solution auto-injector; Inject 2.4 mg under the skin into the appropriate area as directed 1 (One) Time Per Week. For 4 weeks  Dispense: 3 mL; Refill: 3  -     Comprehensive Metabolic Panel; Future  -     Lipid Panel; Future  -     CBC & Differential; Future  -     Vitamin B12; Future  -     Folate; Future  -     Vitamin D,25-Hydroxy; Future  -     Magnesium; Future  -     Microalbumin / Creatinine Urine Ratio - Urine, Clean Catch; Future  -     Hemoglobin A1c; Future  -     TSH+Free T4; Future  -     T3, Free; Future  -     Comprehensive  Metabolic Panel  -     Lipid Panel  -     CBC & Differential  -     Vitamin B12  -     Folate  -     Vitamin D,25-Hydroxy  -     Magnesium  -     Microalbumin / Creatinine Urine Ratio - Urine, Clean Catch  -     Hemoglobin A1c  -     TSH+Free T4  -     T3, Free    2. BMI 30.0-30.9,adult  Assessment & Plan:  Patient's (Body mass index is 30.88 kg/m².) indicates that they are obese (BMI >30) with health conditions that include obstructive sleep apnea, impaired fasting glucose, dyslipidemias, and osteoarthritis . Weight is worsening. BMI  is above average; BMI management plan is completed. We discussed low calorie, low carb based diet program, portion control, increasing exercise, and pharmacologic options including try Wegovy .     Patient needs to lose 40 pounds before can get the inspire for ELAN per ENT.  Goal weight is 235 pounds  262 pounds-7/25/2024-started Wegovy 0.25   250 pounds-9/26/2024-on Wegovy 0.5   234 pounds-1/16/2025-on Wegovy 1.7    Plan-Currently tolerating the 1.7 mg dose for the past month no nausea  Increase Wegovy to 2 to 4 mg every week        Orders:  -     Semaglutide-Weight Management (Wegovy) 2.4 MG/0.75ML solution auto-injector; Inject 2.4 mg under the skin into the appropriate area as directed 1 (One) Time Per Week. For 4 weeks  Dispense: 3 mL; Refill: 3  -     Comprehensive Metabolic Panel; Future  -     Lipid Panel; Future  -     CBC & Differential; Future  -     Vitamin B12; Future  -     Folate; Future  -     Vitamin D,25-Hydroxy; Future  -     Magnesium; Future  -     Microalbumin / Creatinine Urine Ratio - Urine, Clean Catch; Future  -     Hemoglobin A1c; Future  -     TSH+Free T4; Future  -     T3, Free; Future  -     Comprehensive Metabolic Panel  -     Lipid Panel  -     CBC & Differential  -     Vitamin B12  -     Folate  -     Vitamin D,25-Hydroxy  -     Magnesium  -     Microalbumin / Creatinine Urine Ratio - Urine, Clean Catch  -     Hemoglobin A1c  -     TSH+Free T4  -      T3, Free    3. Prediabetes  Comments:  A1c 6 no recent labs as ordered follow a low-carb diet check A1c today  Orders:  -     Semaglutide-Weight Management (Wegovy) 2.4 MG/0.75ML solution auto-injector; Inject 2.4 mg under the skin into the appropriate area as directed 1 (One) Time Per Week. For 4 weeks  Dispense: 3 mL; Refill: 3  -     Comprehensive Metabolic Panel; Future  -     Lipid Panel; Future  -     CBC & Differential; Future  -     Vitamin B12; Future  -     Folate; Future  -     Vitamin D,25-Hydroxy; Future  -     Magnesium; Future  -     Microalbumin / Creatinine Urine Ratio - Urine, Clean Catch; Future  -     Hemoglobin A1c; Future  -     TSH+Free T4; Future  -     T3, Free; Future  -     Comprehensive Metabolic Panel  -     Lipid Panel  -     CBC & Differential  -     Vitamin B12  -     Folate  -     Vitamin D,25-Hydroxy  -     Magnesium  -     Microalbumin / Creatinine Urine Ratio - Urine, Clean Catch  -     Hemoglobin A1c  -     TSH+Free T4  -     T3, Free    4. ELAN (obstructive sleep apnea)  Assessment & Plan:  Wants to get inspire but has to get to 235 lbs  Not using his CPAP bc can't sleep    Plan has been on wegovy 1.7 weekly for past month tolerating  Increase wegovy to 2.4 mg weekly     Orders:  -     Semaglutide-Weight Management (Wegovy) 2.4 MG/0.75ML solution auto-injector; Inject 2.4 mg under the skin into the appropriate area as directed 1 (One) Time Per Week. For 4 weeks  Dispense: 3 mL; Refill: 3  -     Comprehensive Metabolic Panel; Future  -     Lipid Panel; Future  -     CBC & Differential; Future  -     Vitamin B12; Future  -     Folate; Future  -     Vitamin D,25-Hydroxy; Future  -     Magnesium; Future  -     Microalbumin / Creatinine Urine Ratio - Urine, Clean Catch; Future  -     Hemoglobin A1c; Future  -     TSH+Free T4; Future  -     T3, Free; Future  -     Comprehensive Metabolic Panel  -     Lipid Panel  -     CBC & Differential  -     Vitamin B12  -     Folate  -     Vitamin  D,25-Hydroxy  -     Magnesium  -     Microalbumin / Creatinine Urine Ratio - Urine, Clean Catch  -     Hemoglobin A1c  -     TSH+Free T4  -     T3, Free    5. Allergic contact dermatitis, unspecified trigger  -     triamcinolone (KENALOG) 0.1 % cream; Apply 1 Application topically to the appropriate area as directed 2 (Two) Times a Day.  Dispense: 45 g; Refill: 0  -     Comprehensive Metabolic Panel; Future  -     Lipid Panel; Future  -     CBC & Differential; Future  -     Vitamin B12; Future  -     Folate; Future  -     Vitamin D,25-Hydroxy; Future  -     Magnesium; Future  -     Microalbumin / Creatinine Urine Ratio - Urine, Clean Catch; Future  -     Hemoglobin A1c; Future  -     TSH+Free T4; Future  -     T3, Free; Future  -     Comprehensive Metabolic Panel  -     Lipid Panel  -     CBC & Differential  -     Vitamin B12  -     Folate  -     Vitamin D,25-Hydroxy  -     Magnesium  -     Microalbumin / Creatinine Urine Ratio - Urine, Clean Catch  -     Hemoglobin A1c  -     TSH+Free T4  -     T3, Free    6. BPH with obstruction/lower urinary tract symptoms  Assessment & Plan:  Continue tamsulosin 0.4 mg daily    Orders:  -     Comprehensive Metabolic Panel; Future  -     Lipid Panel; Future  -     CBC & Differential; Future  -     Vitamin B12; Future  -     Folate; Future  -     Vitamin D,25-Hydroxy; Future  -     Magnesium; Future  -     Microalbumin / Creatinine Urine Ratio - Urine, Clean Catch; Future  -     Hemoglobin A1c; Future  -     TSH+Free T4; Future  -     T3, Free; Future  -     Comprehensive Metabolic Panel  -     Lipid Panel  -     CBC & Differential  -     Vitamin B12  -     Folate  -     Vitamin D,25-Hydroxy  -     Magnesium  -     Microalbumin / Creatinine Urine Ratio - Urine, Clean Catch  -     Hemoglobin A1c  -     TSH+Free T4  -     T3, Free    7. Elevated liver enzymes  Comments:  Patient with only occasional EtOH use recheck liver function tests today-negative hepatitis C antibody in  2022  Orders:  -     Comprehensive Metabolic Panel; Future  -     Lipid Panel; Future  -     CBC & Differential; Future  -     Vitamin B12; Future  -     Folate; Future  -     Vitamin D,25-Hydroxy; Future  -     Magnesium; Future  -     Microalbumin / Creatinine Urine Ratio - Urine, Clean Catch; Future  -     Hemoglobin A1c; Future  -     TSH+Free T4; Future  -     T3, Free; Future  -     Comprehensive Metabolic Panel  -     Lipid Panel  -     CBC & Differential  -     Vitamin B12  -     Folate  -     Vitamin D,25-Hydroxy  -     Magnesium  -     Microalbumin / Creatinine Urine Ratio - Urine, Clean Catch  -     Hemoglobin A1c  -     TSH+Free T4  -     T3, Free  -     Hepatitis C Antibody; Future    8. Vitamin D deficiency  -     vitamin D (ERGOCALCIFEROL) 1.25 MG (01382 UT) capsule capsule; On e capsule every month  Dispense: 3 capsule; Refill: 1  -     Comprehensive Metabolic Panel; Future  -     Lipid Panel; Future  -     CBC & Differential; Future  -     Vitamin B12; Future  -     Folate; Future  -     Vitamin D,25-Hydroxy; Future  -     Magnesium; Future  -     Microalbumin / Creatinine Urine Ratio - Urine, Clean Catch; Future  -     Hemoglobin A1c; Future  -     TSH+Free T4; Future  -     T3, Free; Future  -     Comprehensive Metabolic Panel  -     Lipid Panel  -     CBC & Differential  -     Vitamin B12  -     Folate  -     Vitamin D,25-Hydroxy  -     Magnesium  -     Microalbumin / Creatinine Urine Ratio - Urine, Clean Catch  -     Hemoglobin A1c  -     TSH+Free T4  -     T3, Free    9. Encounter for hepatitis C virus screening test for high risk patient    10. Tattoo of skin  -     Hepatitis C Antibody; Future            Plan-1/16/2025  Patient Instructions   Goal weight is less than 235 pounds in order to try to get inspire for his obstructive sleep apnea  Increase Wegovy to 2.4 mg every week    Continue with lifestyle changes exercise 30 minutes daily  Patient now is taking care of his dad who is in a  nursing home but with chronic and terminal medical problems.    Do labs today  Follow-up in 3 months to check on weight and Wegovy   Physical due in July 2025    FOLLOW UP  Return in about 3 months (around 4/7/2025) for Recheck.    Patient was given instructions and counseling regarding his condition or for health maintenance advice. Please see specific information pulled into the AVS if appropriate.     Older Notes  9/26/2024 visit  patient here for follow-up     Records reviewed, meds reviewed in detail, labs reviewed with patient.  Plan of care is as follows below.     Was exposed to grandkids- has had diarrhea- 2x/day for past week -none today or last night-has been on wegovy 0.5 for past 3 weeks-     Obesity  262 pounds-7/25/2024-started Wegovy 0.25   250 pounds-9/26/2024-on Wegovy 0.5     -9/26/2024  Patient Instructions   Wants to get inspire -goal 235 lbs-not using CPAP  Cont with wegovy -1mg in a few weeks  Zofran and imodium  F/u 3-4 months with labs prior  Vit D 50,000 u once a month       FOLLOW UP  Return in about 4 months (around 1/13/2025) for Recheck.  Of Wegovy dose with labs prior     Patient was given instructions and counseling regarding his condition or for health maintenance advice. Please see specific information pulled into the AVS if appropriate.      Older Notes  Patient Instructions 7/25/2024   Rec RSV and Shingrix vaccines  Refer to Derm for the volume left forehead  Try Medrol Dosepak and steroid cream to areas  Try Wegovy if insurance and if work provides 0.25 mg every week for 1 month and then go up to 2.5 mg every week  Cut back on portions and follow a low-carb diet      7/25/2024 visit   here for annual physical     Records reviewed, meds reviewed in detail, labs reviewed with patient.  Plan of care is as follows below.        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

## 2025-01-15 NOTE — TELEPHONE ENCOUNTER
"Pa on wegovy denied for following reason \"the use of this medication without a patient having participated in a comprehensive weight  management program (e.g. [employer] or another weight loss program) does not establish  medical necessity for this drug. Medical necessity is determined by adherence to generally  accepted standards of medical practice in the [place], is clinically appropriate, in terms  of type, frequency, extent, site, duration and considered effective for the patient's illness,  injury, disease, or its symptoms.\"  "

## 2025-01-16 ENCOUNTER — OFFICE VISIT (OUTPATIENT)
Dept: INTERNAL MEDICINE | Age: 60
End: 2025-01-16
Payer: COMMERCIAL

## 2025-01-16 VITALS
HEART RATE: 62 BPM | OXYGEN SATURATION: 96 % | RESPIRATION RATE: 18 BRPM | TEMPERATURE: 98.6 F | BODY MASS INDEX: 31.01 KG/M2 | DIASTOLIC BLOOD PRESSURE: 58 MMHG | SYSTOLIC BLOOD PRESSURE: 110 MMHG | HEIGHT: 73 IN | WEIGHT: 234 LBS

## 2025-01-16 DIAGNOSIS — R74.8 ELEVATED LIVER ENZYMES: Chronic | ICD-10-CM

## 2025-01-16 DIAGNOSIS — N13.8 BPH WITH OBSTRUCTION/LOWER URINARY TRACT SYMPTOMS: ICD-10-CM

## 2025-01-16 DIAGNOSIS — L23.9 ALLERGIC CONTACT DERMATITIS, UNSPECIFIED TRIGGER: ICD-10-CM

## 2025-01-16 DIAGNOSIS — E66.01 MORBID OBESITY: Primary | ICD-10-CM

## 2025-01-16 DIAGNOSIS — L81.8 TATTOO OF SKIN: ICD-10-CM

## 2025-01-16 DIAGNOSIS — Z91.89 ENCOUNTER FOR HEPATITIS C VIRUS SCREENING TEST FOR HIGH RISK PATIENT: ICD-10-CM

## 2025-01-16 DIAGNOSIS — E55.9 VITAMIN D DEFICIENCY: ICD-10-CM

## 2025-01-16 DIAGNOSIS — G47.33 OSA (OBSTRUCTIVE SLEEP APNEA): ICD-10-CM

## 2025-01-16 DIAGNOSIS — R73.03 PREDIABETES: ICD-10-CM

## 2025-01-16 DIAGNOSIS — N40.1 BPH WITH OBSTRUCTION/LOWER URINARY TRACT SYMPTOMS: ICD-10-CM

## 2025-01-16 DIAGNOSIS — Z11.59 ENCOUNTER FOR HEPATITIS C VIRUS SCREENING TEST FOR HIGH RISK PATIENT: ICD-10-CM

## 2025-01-16 LAB
25(OH)D3 SERPL-MCNC: 23.8 NG/ML (ref 30–100)
ALBUMIN SERPL-MCNC: 4 G/DL (ref 3.5–5.2)
ALBUMIN/GLOB SERPL: 1.3 G/DL
ALP SERPL-CCNC: 88 U/L (ref 39–117)
ALT SERPL W P-5'-P-CCNC: 29 U/L (ref 1–41)
ANION GAP SERPL CALCULATED.3IONS-SCNC: 11 MMOL/L (ref 5–15)
AST SERPL-CCNC: 35 U/L (ref 1–40)
BASOPHILS # BLD AUTO: 0.03 10*3/MM3 (ref 0–0.2)
BASOPHILS NFR BLD AUTO: 0.8 % (ref 0–1.5)
BILIRUB SERPL-MCNC: 1.3 MG/DL (ref 0–1.2)
BUN SERPL-MCNC: 13 MG/DL (ref 8–23)
BUN/CREAT SERPL: 16 (ref 7–25)
CALCIUM SPEC-SCNC: 9.6 MG/DL (ref 8.6–10.5)
CHLORIDE SERPL-SCNC: 109 MMOL/L (ref 98–107)
CHOLEST SERPL-MCNC: 133 MG/DL (ref 0–200)
CO2 SERPL-SCNC: 25 MMOL/L (ref 22–29)
CREAT SERPL-MCNC: 0.81 MG/DL (ref 0.76–1.27)
DEPRECATED RDW RBC AUTO: 41.2 FL (ref 37–54)
EGFRCR SERPLBLD CKD-EPI 2021: 100.3 ML/MIN/1.73
EOSINOPHIL # BLD AUTO: 0.11 10*3/MM3 (ref 0–0.4)
EOSINOPHIL NFR BLD AUTO: 2.8 % (ref 0.3–6.2)
ERYTHROCYTE [DISTWIDTH] IN BLOOD BY AUTOMATED COUNT: 11.7 % (ref 12.3–15.4)
FOLATE SERPL-MCNC: 10.3 NG/ML (ref 4.78–24.2)
GLOBULIN UR ELPH-MCNC: 3.1 GM/DL
GLUCOSE SERPL-MCNC: 95 MG/DL (ref 65–99)
HBA1C MFR BLD: 5.3 % (ref 4.8–5.6)
HCT VFR BLD AUTO: 39.1 % (ref 37.5–51)
HDLC SERPL-MCNC: 46 MG/DL (ref 40–60)
HGB BLD-MCNC: 13.3 G/DL (ref 13–17.7)
IMM GRANULOCYTES # BLD AUTO: 0.01 10*3/MM3 (ref 0–0.05)
IMM GRANULOCYTES NFR BLD AUTO: 0.3 % (ref 0–0.5)
LDLC SERPL CALC-MCNC: 75 MG/DL (ref 0–100)
LDLC/HDLC SERPL: 1.64 {RATIO}
LYMPHOCYTES # BLD AUTO: 0.99 10*3/MM3 (ref 0.7–3.1)
LYMPHOCYTES NFR BLD AUTO: 25.2 % (ref 19.6–45.3)
MAGNESIUM SERPL-MCNC: 2.1 MG/DL (ref 1.6–2.4)
MCH RBC QN AUTO: 32.7 PG (ref 26.6–33)
MCHC RBC AUTO-ENTMCNC: 34 G/DL (ref 31.5–35.7)
MCV RBC AUTO: 96.1 FL (ref 79–97)
MONOCYTES # BLD AUTO: 0.39 10*3/MM3 (ref 0.1–0.9)
MONOCYTES NFR BLD AUTO: 9.9 % (ref 5–12)
NEUTROPHILS NFR BLD AUTO: 2.4 10*3/MM3 (ref 1.7–7)
NEUTROPHILS NFR BLD AUTO: 61 % (ref 42.7–76)
NRBC BLD AUTO-RTO: 0 /100 WBC (ref 0–0.2)
PLATELET # BLD AUTO: 114 10*3/MM3 (ref 140–450)
PMV BLD AUTO: 11.8 FL (ref 6–12)
POTASSIUM SERPL-SCNC: 4.1 MMOL/L (ref 3.5–5.2)
PROT SERPL-MCNC: 7.1 G/DL (ref 6–8.5)
RBC # BLD AUTO: 4.07 10*6/MM3 (ref 4.14–5.8)
SODIUM SERPL-SCNC: 145 MMOL/L (ref 136–145)
T3FREE SERPL-MCNC: 3.39 PG/ML (ref 2–4.4)
T4 FREE SERPL-MCNC: 1.04 NG/DL (ref 0.92–1.68)
TRIGL SERPL-MCNC: 57 MG/DL (ref 0–150)
TSH SERPL DL<=0.05 MIU/L-ACNC: 2.02 UIU/ML (ref 0.27–4.2)
VIT B12 BLD-MCNC: 339 PG/ML (ref 211–946)
VLDLC SERPL-MCNC: 12 MG/DL (ref 5–40)
WBC NRBC COR # BLD AUTO: 3.93 10*3/MM3 (ref 3.4–10.8)

## 2025-01-16 PROCEDURE — 84481 FREE ASSAY (FT-3): CPT | Performed by: INTERNAL MEDICINE

## 2025-01-16 PROCEDURE — 80061 LIPID PANEL: CPT | Performed by: INTERNAL MEDICINE

## 2025-01-16 PROCEDURE — 84439 ASSAY OF FREE THYROXINE: CPT | Performed by: INTERNAL MEDICINE

## 2025-01-16 PROCEDURE — 82306 VITAMIN D 25 HYDROXY: CPT | Performed by: INTERNAL MEDICINE

## 2025-01-16 PROCEDURE — 83036 HEMOGLOBIN GLYCOSYLATED A1C: CPT | Performed by: INTERNAL MEDICINE

## 2025-01-16 PROCEDURE — 83735 ASSAY OF MAGNESIUM: CPT | Performed by: INTERNAL MEDICINE

## 2025-01-16 PROCEDURE — 80050 GENERAL HEALTH PANEL: CPT | Performed by: INTERNAL MEDICINE

## 2025-01-16 PROCEDURE — 82746 ASSAY OF FOLIC ACID SERUM: CPT | Performed by: INTERNAL MEDICINE

## 2025-01-16 PROCEDURE — 99214 OFFICE O/P EST MOD 30 MIN: CPT | Performed by: INTERNAL MEDICINE

## 2025-01-16 PROCEDURE — 82607 VITAMIN B-12: CPT | Performed by: INTERNAL MEDICINE

## 2025-01-16 RX ORDER — ERGOCALCIFEROL 1.25 MG/1
CAPSULE, LIQUID FILLED ORAL
Qty: 3 CAPSULE | Refills: 1 | Status: SHIPPED | OUTPATIENT
Start: 2025-01-16 | End: 2025-01-17

## 2025-01-16 RX ORDER — TRIAMCINOLONE ACETONIDE 1 MG/G
1 CREAM TOPICAL 2 TIMES DAILY
Qty: 45 G | Refills: 0 | Status: SHIPPED | OUTPATIENT
Start: 2025-01-16

## 2025-01-16 RX ORDER — SEMAGLUTIDE 2.4 MG/.75ML
2.4 INJECTION, SOLUTION SUBCUTANEOUS WEEKLY
Qty: 3 ML | Refills: 3 | Status: SHIPPED | OUTPATIENT
Start: 2025-01-16

## 2025-01-16 NOTE — PATIENT INSTRUCTIONS
Goal weight is less than 235 pounds in order to try to get inspire for his obstructive sleep apnea  Increase Wegovy to 2.4 mg every week    Continue with lifestyle changes exercise 30 minutes daily  Patient now is taking care of his dad who is in a nursing home but with chronic and terminal medical problems.    Do labs today  Follow-up in 3 months to check on weight and Wegovy

## 2025-01-16 NOTE — ASSESSMENT & PLAN NOTE
Patient's (Body mass index is 30.88 kg/m².) indicates that they are obese (BMI >30) with health conditions that include obstructive sleep apnea, impaired fasting glucose, dyslipidemias, and osteoarthritis . Weight is worsening. BMI  is above average; BMI management plan is completed. We discussed low calorie, low carb based diet program, portion control, increasing exercise, and pharmacologic options including try Wegovy .     Patient needs to lose 40 pounds before can get the inspire for ELAN per ENT.  Goal weight is 235 pounds  262 pounds-7/25/2024-started Wegovy 0.25   250 pounds-9/26/2024-on Wegovy 0.5   234 pounds-1/16/2025-on Wegovy 1.7    Plan-Currently tolerating the 1.7 mg dose for the past month no nausea  Increase Wegovy to 2 to 4 mg every week

## 2025-01-16 NOTE — ASSESSMENT & PLAN NOTE
Patient's (Body mass index is 30.88 kg/m².) indicates that they are obese (BMI >30) with health conditions that include obstructive sleep apnea, impaired fasting glucose, dyslipidemias, and osteoarthritis . Weight is improving with treatment. BMI  is above average; BMI management plan is completed. We discussed low calorie, low carb based diet program, portion control, increasing exercise, and pharmacologic options including Wegovy .    Patient needs to lose 40 pounds before can get the inspire for ELAN per ENT.  Goal weight is 235 pounds  262 pounds-7/25/2024-started Wegovy 0.25   250 pounds-9/26/2024-on Wegovy 0.5   234 pounds-1/16/2025-on Wegovy 1.7     Plan-Currently tolerating the 1.7 mg dose for the past month no nausea  Increase Wegovy to 2 to 4 mg every week

## 2025-01-16 NOTE — ASSESSMENT & PLAN NOTE
Ages 40-64 Counseling/Anticipatory Guidance Discussed: nutrition, physical activity, healthy weight, injury prevention, misuse of tobacco, alcohol and drugs, sexual behavior and STDs, contraception, dental health, mental health, immunizations, screenings, and use of seatbelt

## 2025-01-16 NOTE — ASSESSMENT & PLAN NOTE
Wants to get inspire but has to get to 235 lbs  Not using his CPAP bc can't sleep    Plan has been on wegovy 1.7 weekly for past month tolerating  Increase wegovy to 2.4 mg weekly

## 2025-01-17 ENCOUNTER — TELEPHONE (OUTPATIENT)
Dept: INTERNAL MEDICINE | Age: 60
End: 2025-01-17
Payer: COMMERCIAL

## 2025-01-17 DIAGNOSIS — E55.9 VITAMIN D DEFICIENCY: Primary | ICD-10-CM

## 2025-01-17 RX ORDER — ERGOCALCIFEROL 1.25 MG/1
50000 CAPSULE, LIQUID FILLED ORAL WEEKLY
Qty: 10 CAPSULE | Refills: 1 | Status: SHIPPED | OUTPATIENT
Start: 2025-01-17

## 2025-01-17 NOTE — TELEPHONE ENCOUNTER
He does not want to do any weight loss programs and it has to be like the one the insurance has specifically I did tell him that since he has no interest in any of the programs we may not get any weight loss medications approved and he voiced understanding

## 2025-01-17 NOTE — TELEPHONE ENCOUNTER
I have spoken to pt and he is not interested in any type of weight loss program nor has he been in one. I have let him know this means that the insurance will not cover it he would like to know how to proceed

## 2025-04-16 ENCOUNTER — TELEPHONE (OUTPATIENT)
Dept: INTERNAL MEDICINE | Age: 60
End: 2025-04-16
Payer: COMMERCIAL

## 2025-05-23 ENCOUNTER — HOSPITAL ENCOUNTER (EMERGENCY)
Facility: HOSPITAL | Age: 60
Discharge: HOME | End: 2025-05-23
Attending: EMERGENCY MEDICINE
Payer: COMMERCIAL

## 2025-05-23 ENCOUNTER — APPOINTMENT (OUTPATIENT)
Dept: GENERAL RADIOLOGY | Facility: HOSPITAL | Age: 60
End: 2025-05-23
Payer: COMMERCIAL

## 2025-05-23 VITALS
BODY MASS INDEX: 32.64 KG/M2 | DIASTOLIC BLOOD PRESSURE: 72 MMHG | OXYGEN SATURATION: 100 % | HEART RATE: 64 BPM | RESPIRATION RATE: 18 BRPM | SYSTOLIC BLOOD PRESSURE: 129 MMHG | HEIGHT: 72 IN | WEIGHT: 240.96 LBS | TEMPERATURE: 98.1 F

## 2025-05-23 DIAGNOSIS — S49.92XA INJURY OF LEFT SHOULDER, INITIAL ENCOUNTER: Primary | ICD-10-CM

## 2025-05-23 PROCEDURE — 99283 EMERGENCY DEPT VISIT LOW MDM: CPT

## 2025-05-23 PROCEDURE — 73030 X-RAY EXAM OF SHOULDER: CPT

## 2025-05-23 RX ORDER — HYDROCODONE BITARTRATE AND ACETAMINOPHEN 5; 325 MG/1; MG/1
1 TABLET ORAL EVERY 6 HOURS PRN
Qty: 15 TABLET | Refills: 0 | Status: SHIPPED | OUTPATIENT
Start: 2025-05-23

## 2025-05-23 NOTE — DISCHARGE INSTRUCTIONS
Follow-up with orthopedics.  Use the sling until cleared by them.  Return should you have new or worsening symptoms.

## 2025-05-23 NOTE — ED PROVIDER NOTES
Time: 4:21 PM EDT  Date of encounter:  5/23/2025  Independent Historian/Clinical History and Information was obtained by:   Patient    History is limited by: N/A    Chief Complaint   Patient presents with    Fall    Arm Injury         History of Present Illness:  Patient is a 60 y.o. year old male who presents to the emergency department for evaluation of left shoulder pain after falling from horse today.  Patient denies any his head, loss of consciousness or any other injuries.    Patient Care Team  Primary Care Provider: Silvia Romero MD    Past Medical History:     Allergies   Allergen Reactions    Codeine Anaphylaxis and Unknown - High Severity     Past Medical History:   Diagnosis Date    Carpal tunnel syndrome     RIGHT    GERD (gastroesophageal reflux disease)     Prostate disorder     BPH    Seasonal allergies     Sleep apnea     DOESNT USE CPAP CURRENTLY    Trigger finger     KATERINE 2ND DIGITS     Past Surgical History:   Procedure Laterality Date    APPENDECTOMY      BACK SURGERY      L4/L5 DISC HERNIATION    CARPAL TUNNEL RELEASE Left 11/03/2022    Procedure: LEFT CARPAL TUNNEL RELEASE, AND LEFT INDEX MIDDLE FINGER AND TRIGGER FINGER RELEASE;  Surgeon: Jonnathan Alegria MD;  [place] [facility] [place] [facility] [place]  Service: [place]opedics;  Laterality: Left;    CARPAL TUNNEL RELEASE Right 12/29/2022    Procedure: RIGHT CARPAL TUNNEL RELEASE;  Surgeon: Jonnathan Alegria MD;  [place] [facility] [place] [facility] [place]  Service: Orthopedics;  Laterality: Right;    COLONOSCOPY      ENDOSCOPY      HERNIA REPAIR Right     INGUINAL HERNIA    SHOULDER ARTHROSCOPY Right     FOR SAD/CD AND ROTATOR CUFF REPAIR ON 12/21/17    TOTAL KNEE ARTHROPLASTY Right     TRACHEOSTOMY      X2 S/P THROAT TRAUMA    TRIGGER FINGER RELEASE Bilateral 12/29/2022    Procedure: SECOND FINGER TRIGGER RELEASE;  Surgeon: Jonnathan Alegria MD;  [place] [facility] [place] [facility] [place]  Service: Orthopedics;  Laterality: Bilateral;     Family History   Problem Relation Age of  "Onset    No Known Problems Mother     No Known Problems Father     Gabe Hyperthermia Neg Hx        Home Medications:  Prior to Admission medications    Medication Sig Start Date End Date Taking? Authorizing Provider   Semaglutide-Weight Management (Wegovy) 2.4 MG/0.75ML solution auto-injector Inject 2.4 mg under the skin into the appropriate area as directed 1 (One) Time Per Week. For 4 weeks 1/16/25   Silvia Romero MD   tamsulosin (FLOMAX) 0.4 MG capsule 24 hr capsule TAKE 1 CAPSULE BY MOUTH DAILY 12/26/24   Silvia Romero MD   triamcinolone (KENALOG) 0.1 % cream Apply 1 Application topically to the appropriate area as directed 2 (Two) Times a Day. 7/25/24   Silvia Romero MD   triamcinolone (KENALOG) 0.1 % cream Apply 1 Application topically to the appropriate area as directed 2 (Two) Times a Day. 1/16/25   Silvia Romero MD   vitamin D (ERGOCALCIFEROL) 1.25 MG (02003 UT) capsule capsule Take 1 capsule by mouth 1 (One) Time Per Week. For 2 months then 50,000 units once a month 1/17/25   Silvia Romero MD        Social History:   Social History     Tobacco Use    Smoking status: Never    Smokeless tobacco: Former   Vaping Use    Vaping status: Never Used   Substance Use Topics    Alcohol use: Yes     Comment: CURRENT SOME DAY    Drug use: Never         Review of Systems:  Review of Systems   Musculoskeletal:  Positive for arthralgias.        Physical Exam:  /72   Pulse 64   Temp 98.1 °F (36.7 °C) (Oral)   Resp 18   Ht 182.9 cm (72\")   Wt 109 kg (240 lb 15.4 oz)   SpO2 100%   BMI 32.68 kg/m²         Physical Exam  Constitutional:       Appearance: Normal appearance.   HENT:      Head: Normocephalic.   Pulmonary:      Effort: Pulmonary effort is normal.   Musculoskeletal:      Comments: Tenderness palpation of the left shoulder.  Decreased range of motion due to pain.  Neurovascular intact distally.   Skin:     " General: Skin is warm.      Coloration: Skin is not cyanotic.   Neurological:      General: No focal deficit present.      Mental Status: He is alert.   Psychiatric:         Attention and Perception: Attention and perception normal.         Mood and Affect: Mood normal.                            Medical Decision Making:      Comorbidities that affect care:    GERD    External Notes reviewed:    Reviewed note from 1/16/2025      The following orders were placed and all results were independently analyzed by me:  Orders Placed This Encounter   Procedures    [facility] 03. Shoulder Immobilizer/Sling & Swathe (); Left    XR Shoulder 2+ View Left    Obtain & Apply The Following- Upper extremity       Medications Given in the Emergency Department:  Medications - No data to display     ED Course:    The patient was initially evaluated in the triage area where orders were placed. The patient was later dispositioned by Alexis Esquivel MD.      The patient was advised to stay for completion of workup which includes but is not limited to communication of labs and radiological results, reassessment and plan. The patient was advised that leaving prior to disposition by a provider could result in critical findings that are not communicated to the patient.     ED Course as of 05/23/25 1721   Fri May 23, 2025   1621   --- PROVIDER IN TRIAGE NOTE ---    The patient was evaluated by Rosemarie boston in triage. Orders were placed and the patient is currently awaiting disposition.      [CE]      ED Course User Index  [CE] Rosemarie Merino APRN       Labs:    Lab Results (last 24 hours)       ** No results found for the last 24 hours. **             Imaging:    XR Shoulder 2+ View Left  Result Date: 5/23/2025  XR SHOULDER 2+ VW LEFT Date of Exam: 5/23/2025 4:28 PM EDT Indication: injury Comparison: None available. Findings: No acute fracture or dislocation. Mild to moderate degenerative changes of the glenohumeral and  AC joints. No suspicious erosive changes. The visualized soft tissue is unremarkable. The visualized lungs are clear.     Impression: No acute osseous abnormality. Electronically Signed: Alexander Lorenz DO  5/23/2025 4:40 PM EDT  Workstation ID: [id number]        Differential Diagnosis and Discussion:      Orthopedic Injuries: Differential diagnosis includes but is not limited to fractures, soft tissue injuries, dislocations, contusions, ligamentous injuries, tendon injuries, nerve injuries, compartment syndrome, bursitis, and vascular injuries.    PROCEDURES:    X-ray were performed in the emergency department and all X-ray impressions were independently interpreted by me.    No orders to display        Procedures    MDM       Patient is a 60-year-old gentleman who presents status post fall.  States he fell off a horse.  Has tenderness to the left shoulder.  No fracture noted but has decreased range of motion and significant pain around it.  Could just be soft tissue injury/tears.  Could have an underlying fracture that is not showing up on x-ray.  Will place in a sling and have the patient follow-up with orthopedics.  Will DC.            Patient Care Considerations:          Consultants/Shared Management Plan:    None    Social Determinants of Health:    Patient is independent, reliable, and has access to care.       Disposition and Care Coordination:    Discharged: The patient is suitable and stable for discharge with no need for consideration of admission.    I have explained the patient´s condition, diagnoses and treatment plan based on the information available to me at this time. I have answered questions and addressed any concerns. The patient has a good  understanding of the patient´s diagnosis, condition, and treatment plan as can be expected at this point. The vital signs have been stable. The patient´s condition is stable and appropriate for discharge from the emergency department.      The patient will  pursue further outpatient evaluation with the primary care physician or other designated or consulting physician as outlined in the discharge instructions. They are agreeable to this plan of care and follow-up instructions have been explained in detail. The patient has received these instructions in written format and have expressed an understanding of the discharge instructions. The patient is aware that any significant change in condition or worsening of symptoms should prompt an immediate return to this or the closest emergency department or call to 911.      Final diagnoses:   Injury of left shoulder, initial encounter        ED Disposition       ED Disposition   Discharge    Condition   Stable    Comment   --               This medical record created using voice recognition software.             Alexis Esquivel MD  05/23/25 [id number]

## 2025-05-27 ENCOUNTER — TELEPHONE (OUTPATIENT)
Dept: ORTHOPEDIC SURGERY | Facility: CLINIC | Age: 60
End: 2025-05-27
Payer: COMMERCIAL

## 2025-05-27 ENCOUNTER — TELEPHONE (OUTPATIENT)
Dept: INTERNAL MEDICINE | Age: 60
End: 2025-05-27

## 2025-05-27 NOTE — TELEPHONE ENCOUNTER
Caller: Sherly Jo    Relationship: Self    Best call back number: [phone]    Requested Prescriptions:   PAIN MEDICATION TO HELP WITH L SHOULDER PAIN     Pharmacy where request should be sent: [id number] - [place], [place] - [address] AT [facility] [phone] [facility] [phone]      Last office visit with prescribing clinician: Visit date not found   Last telemedicine visit with prescribing clinician: Visit date not found   Next office visit with prescribing clinician: 6/11/2025     Additional details provided by patient: PT RAN OUT OF PAIN MEDS GIVEN BY ER. PT REQUESTING RX FROM DR YORK TO HELP RELIEVE PAIN UNTIL PT SEES DR YORK ON 06/11    Does the patient have less than a 3 day supply:  [x] Yes  [] No    Would you like a call back once the refill request has been completed: [x] Yes [] No    If the office needs to give you a call back, can they leave a voicemail: [] Yes [] No    Pako Purvis   05/27/25 11:53 EDT

## 2025-05-27 NOTE — TELEPHONE ENCOUNTER
Caller: Sherly Jo    Relationship: Self    Best call back number: [phone]     What medication are you requesting:     HYDROcodone-acetaminophen (NORCO) 5-325 MG per tablet    1 TABLET , EVERY 6 HOURS    What are your current symptoms: LEFT SHOULDER PAIN    How long have you been experiencing symptoms: HURT SHOULDER - HORSE RIDING INJURY    Have you had these symptoms before:   [x] Yes  [] No    Have you been treated for these symptoms before:  [x] Yes  [] No    If a prescription is needed, what is your preferred pharmacy and phone number: [id number] [place] - [address] AT [facility] [phone] [facility] [phone]      Additional notes:  PATIENT WENT TO ER ON 05/23/25, AND THEY HAVE REFERRED HIM TO DR YORK..  PATIENT WAS GIVEN A SHORT SUPPLY OF PAIN MEDICATION.. AND HE IS OUT OF MEDICATION --  HAS FIRST AVAILABLE APPT WITH DR YORK ON JUNE 11TH     JUST WANTS 2 WEEKS OF PAIN MEDS UNTIL HE CAN GET TO ORTHO    PLEASE ADVISE

## 2025-05-27 NOTE — TELEPHONE ENCOUNTER
Caller: Sherly   Relationship: self   Best call back number:   Who are you requesting to speak with (clinical staff, provider, specific staff member): MA    What was the call regarding:    Booked visit with provider to assist patient with ongoing pain from horse injury per the latest notes from Dr. Oropeza.

## 2025-05-27 NOTE — TELEPHONE ENCOUNTER
Spoke with patient -  We haven't see the patient for this issue in the office yet and are not able to send in pain medication. Can check with primary care provider to see if they might be able to give pain medication. Patient understands will check with PCP.

## 2025-05-28 ENCOUNTER — OFFICE VISIT (OUTPATIENT)
Age: 60
End: 2025-05-28
Payer: COMMERCIAL

## 2025-05-28 VITALS
DIASTOLIC BLOOD PRESSURE: 64 MMHG | BODY MASS INDEX: 32.8 KG/M2 | HEIGHT: 72 IN | HEART RATE: 64 BPM | WEIGHT: 242.2 LBS | OXYGEN SATURATION: 97 % | SYSTOLIC BLOOD PRESSURE: 120 MMHG

## 2025-05-28 DIAGNOSIS — M25.512 ACUTE PAIN OF LEFT SHOULDER: Primary | ICD-10-CM

## 2025-05-28 DIAGNOSIS — S49.92XS SHOULDER INJURY, LEFT, SEQUELA: ICD-10-CM

## 2025-05-28 RX ORDER — IBUPROFEN 800 MG/1
800 TABLET, FILM COATED ORAL EVERY 6 HOURS PRN
Qty: 20 TABLET | Refills: 0 | Status: SHIPPED | OUTPATIENT
Start: 2025-05-28

## 2025-05-28 RX ORDER — HYDROCODONE BITARTRATE AND ACETAMINOPHEN 5; 325 MG/1; MG/1
1 TABLET ORAL EVERY 12 HOURS PRN
Qty: 20 TABLET | Refills: 0 | Status: SHIPPED | OUTPATIENT
Start: 2025-05-28

## 2025-05-28 RX ORDER — OMEPRAZOLE 40 MG/1
40 CAPSULE, DELAYED RELEASE ORAL DAILY
Qty: 30 CAPSULE | Refills: 0 | Status: SHIPPED | OUTPATIENT
Start: 2025-05-28

## 2025-05-28 NOTE — PATIENT INSTRUCTIONS
Take the medication as instructed but sparingly  Rest arm use heat or ice  Continue to wear sling  Do not drive while taking hydrocodone  If MRI can be scheduled prior to appointment we will do so referral has been placed  See Ortho on the 11th as planned

## 2025-05-28 NOTE — PROGRESS NOTES
Chief Complaint  Fall (Pt fell  off horse on Friday around 1.00 p.m went to ER ,took xray, gave 2 days pain pills and referred to DR. Alegria , cannot see him until June 11th. Thinks he should have Mri in extreme amount of   pain)    Subjective      Sherly Jo is a 60 y.o. male who presents to [facility]     History of Present Illness  The patient presents for evaluation of left shoulder pain.  His primary care patient of Dr. Oropeza who is on vacation.  He is here today for an acute visit due to persistent severe pain of the left shoulder and decreased mobility.  His medical records and history was briefly reviewed    He sustained an injury to his left shoulder following a fall from a horse on Friday, 05/23/2025. He was evaluated in the emergency room the same day, where radiographic imaging revealed no dislocation or fractures. He reports persistent sharp pain and swelling in the affected shoulder, accompanied by a palpable mass. His range of motion is limited, with difficulty in lifting his arm without assistance. He is right-handed.  He continues to wear a sling.  He has a scheduled appointment with Orthopedics on 06/11/2025. He was prescribed hydrocodone, 1 tablet every 6 hours, for 3 days, which he completed on Monday afternoon, 05/26/2025. Since then, he has been taking ibuprofen, which he reports as being effective in reducing the swelling but not the pain. The pain is severe enough to disrupt his sleep when he rolls over onto the affected shoulder. He is currently employed part-time as a  and requires assistance for driving due to the injury. He works only on Mondays, Tuesdays, and Wednesdays, and will be off for the next 4 days.  Cautioned him about driving while taking narcotics he should not do so.  He also reports gastrointestinal upset with ibuprofen, He has a history of surgery on his right shoulder but no prior injuries to the left shoulder.  He  "also has a history of elevated liver enzymes however at his last comp liver enzymes were in the normal range bilirubin 1.3.  Cautioned him on not taking too much Tylenol and that Norco also contains Tylenol.    I have explained to him limitations with narcotics will refill his [place] for a brief time instructed him to take it only every 12 hours and in between use the ibuprofen I will send over prescription strength 800 every 8 hours to take with food and also send in a prescription for omeprazole to take daily.  He is instructed to use heat or ice continue to wear the sling do not drive and to follow-up with Ortho as planned.    He request to have an MRI states the emergency room physician explained to him he would need an MRI but would have to see Ortho first he is requesting to have the MRI done prior to his visit to expedite treatment as he feels like he cannot move the shoulder.      Review of Systems  Constitutional:  Negative for activity change, fatigue and fever.  HENT:  Negative for congestion, rhinorrhea and sore throat.    Eyes:  Negative for discharge and redness.  Respiratory:  Negative for cough. Negative for shortness of breath, wheezing and stridor.    Cardiovascular:  Negative for chest pain.  Gastrointestinal:  Negative for abdominal pain.  Genitourinary:  Negative for dysuria.  Musculoskeletal: Positive for left shoulder pain and decreased range of motion also reports swelling at the joint  Skin:  Negative for rash and wound.  Neurological:  Negative for weakness and headaches.  Hematological:  Negative for adenopathy.  PAST SURGICAL HISTORY:  Right shoulder surgery    SOCIAL HISTORY  He is currently employed part-time as a .  Instructed he cannot drive on narcotics         Objective   Vital Signs:   Vitals:    05/28/25 0804   BP: 120/64   Pulse: 64   SpO2: 97%   Weight: 110 kg (242 lb 3.2 oz)   Height: 182.9 cm (72.01\")   PainSc: 10-Worst pain ever   PainLoc: Shoulder     Body mass " index is 32.84 kg/m².    Wt Readings from Last 3 Encounters:   05/28/25 110 kg (242 lb 3.2 oz)   05/23/25 109 kg (240 lb 15.4 oz)   01/16/25 106 kg (234 lb)     BP Readings from Last 3 Encounters:   05/28/25 120/64   05/23/25 129/72   01/16/25 110/58       Health Maintenance   Topic Date Due    Pneumococcal Vaccine 50+ (1 of 1 - PCV) Never done    COVID-19 Vaccine (4 - 2024-25 season) 09/01/2024    INFLUENZA VACCINE  07/01/2025    ANNUAL PHYSICAL  07/25/2025    COLORECTAL CANCER SCREENING  12/16/2025    TDAP/TD VACCINES (2 - Td or Tdap) 04/28/2032    HEPATITIS C SCREENING  Completed    ZOSTER VACCINE  Completed       Past Medical History:   Diagnosis Date    Allergic 1982    Basic Allergies    Arthritis 2022    Hands    Carpal tunnel syndrome     RIGHT    GERD (gastroesophageal reflux disease)     Headache ??    Often from allergies but come up often    Obesity 2015    Stomach fat    Prostate disorder     BPH    Seasonal allergies     Sleep apnea     DOESNT USE CPAP CURRENTLY    Trigger finger     KATERINE 2ND DIGITS     Past Surgical History:   Procedure Laterality Date    APPENDECTOMY      BACK SURGERY      L4/L5 DISC HERNIATION    CARPAL TUNNEL RELEASE Left 11/03/2022    Procedure: LEFT CARPAL TUNNEL RELEASE, AND LEFT INDEX MIDDLE FINGER AND TRIGGER FINGER RELEASE;  Surgeon: Jonnathan Alegria MD;  Location: [facility] OR [facility];  Service: Orthopedics;  Laterality: Left;    CARPAL TUNNEL RELEASE Right 12/29/2022    Procedure: RIGHT CARPAL TUNNEL RELEASE;  Surgeon: Jonnathan Alegria MD;  Location: [facility] OR [facility];  Service: Orthopedics;  Laterality: Right;    COLONOSCOPY      ENDOSCOPY      HERNIA REPAIR Right     INGUINAL HERNIA    JOINT REPLACEMENT  2012    Rt Knee. Full Rpl.    SHOULDER ARTHROSCOPY Right     FOR SAD/CD AND ROTATOR CUFF REPAIR ON 12/21/17    SPINE SURGERY  1995    Back surgery. L4 & L5    TOTAL KNEE ARTHROPLASTY Right     TRACHEOSTOMY      X2 S/P THROAT TRAUMA    TRIGGER FINGER RELEASE Bilateral 12/29/2022     Procedure: SECOND FINGER TRIGGER RELEASE;  Surgeon: Jonnathan Alegria MD;  Location: [facility] OR [facility];  Service: Orthopedics;  Laterality: Bilateral;     Family History   Problem Relation Age of Onset    No Known Problems Mother     Depression Father     Hearing loss Father     Malig Hyperthermia Neg Hx      Social History     Socioeconomic History    Marital status:    Tobacco Use    Smoking status: Never    Smokeless tobacco: Former   Vaping Use    Vaping status: Never Used   Substance and Sexual Activity    Alcohol use: Yes     Comment: CURRENT SOME DAY    Drug use: Never    Sexual activity: Defer       Current Outpatient Medications   Medication Instructions    HYDROcodone-acetaminophen (NORCO) 5-325 MG per tablet 1 tablet, Oral, Every 6 Hours PRN    HYDROcodone-acetaminophen (NORCO) 5-325 MG per tablet 1 tablet, Oral, Every 12 Hours PRN    ibuprofen (ADVIL,MOTRIN) 800 mg, Oral, Every 6 Hours PRN    omeprazole (PRILOSEC) 40 mg, Oral, Daily    vitamin D (ERGOCALCIFEROL) 50,000 Units, Oral, Weekly, For 2 months then 50,000 units once a month       Medications Discontinued During This Encounter   Medication Reason    triamcinolone (KENALOG) 0.1 % cream *Therapy completed    tamsulosin (FLOMAX) 0.4 MG capsule 24 hr capsule *Therapy completed    triamcinolone (KENALOG) 0.1 % cream *Therapy completed    Semaglutide-Weight Management (Wegovy) 2.4 MG/0.75ML solution auto-injector *Therapy completed        Physical Exam  Vitals and nursing note reviewed.   Constitutional:       Appearance: Normal appearance.   HENT:      Head: Normocephalic.   Eyes:      Extraocular Movements: Extraocular movements intact.      Pupils: Pupils are equal, round, and reactive to light.   Cardiovascular:      Rate and Rhythm: Normal rate and regular rhythm.      Pulses: Normal pulses.   Pulmonary:      Effort: Pulmonary effort is normal.      Breath sounds: Normal breath sounds.   Abdominal:      Palpations: Abdomen is soft.    Musculoskeletal:      Cervical back: Normal range of motion.      Comments: Left arm in a sling held close to body good brisk capillary refill of digits   Skin:     General: Skin is warm and dry.   Neurological:      General: No focal deficit present.      Mental Status: He is alert.   Psychiatric:         Mood and Affect: Mood normal.         Behavior: Behavior normal.         Thought Content: Thought content normal.          Physical Exam         Result Review :  The following data was reviewed by: JACQUELINE Solares on 05/28/2025:    Labs  No visits with results within 2 Month(s) from this visit.   Latest known visit with results is:   Office Visit on 01/16/2025   Component Date Value Ref Range Status    Glucose 01/16/2025 95  65 - 99 mg/dL Final    BUN 01/16/2025 13  8 - 23 mg/dL Final    Creatinine 01/16/2025 0.81  0.76 - 1.27 mg/dL Final    Sodium 01/16/2025 145  136 - 145 mmol/L Final    Potassium 01/16/2025 4.1  3.5 - 5.2 mmol/L Final    Chloride 01/16/2025 109 (H)  98 - 107 mmol/L Final    CO2 01/16/2025 25.0  22.0 - 29.0 mmol/L Final    Calcium 01/16/2025 9.6  8.6 - 10.5 mg/dL Final    Total Protein 01/16/2025 7.1  6.0 - 8.5 g/dL Final    Albumin 01/16/2025 4.0  3.5 - 5.2 g/dL Final    ALT (SGPT) 01/16/2025 29  1 - 41 U/L Final    AST (SGOT) 01/16/2025 35  1 - 40 U/L Final    Alkaline Phosphatase 01/16/2025 88  39 - 117 U/L Final    Total Bilirubin 01/16/2025 1.3 (H)  0.0 - 1.2 mg/dL Final    Globulin 01/16/2025 3.1  gm/dL Final    A/G Ratio 01/16/2025 1.3  g/dL Final    BUN/Creatinine Ratio 01/16/2025 16.0  7.0 - 25.0 Final    Anion Gap 01/16/2025 11.0  5.0 - 15.0 mmol/L Final    eGFR 01/16/2025 100.3  >60.0 mL/min/1.73 Final    Total Cholesterol 01/16/2025 133  0 - 200 mg/dL Final    Triglycerides 01/16/2025 57  0 - 150 mg/dL Final    HDL Cholesterol 01/16/2025 46  40 - 60 mg/dL Final    LDL Cholesterol  01/16/2025 75  0 - 100 mg/dL Final    VLDL Cholesterol 01/16/2025 12  5 - 40 mg/dL Final     LDL/HDL Ratio 01/16/2025 1.64   Final    Vitamin B-12 01/16/2025 339  211 - 946 pg/mL Final    Folate 01/16/2025 10.30  4.78 - 24.20 ng/mL Final    25 Hydroxy, Vitamin D 01/16/2025 23.8 (L)  30.0 - 100.0 ng/ml Final    Magnesium 01/16/2025 2.1  1.6 - 2.4 mg/dL Final    Hemoglobin A1C 01/16/2025 5.30  4.80 - 5.60 % Final    TSH 01/16/2025 2.020  0.270 - 4.200 uIU/mL Final    Free T4 01/16/2025 1.04  0.92 - 1.68 ng/dL Final    T3, Free 01/16/2025 3.39  2.00 - 4.40 pg/mL Final    WBC 01/16/2025 3.93  3.40 - 10.80 10*3/mm3 Final    RBC 01/16/2025 4.07 (L)  4.14 - 5.80 10*6/mm3 Final    Hemoglobin 01/16/2025 13.3  13.0 - 17.7 g/dL Final    Hematocrit 01/16/2025 39.1  37.5 - 51.0 % Final    MCV 01/16/2025 96.1  79.0 - 97.0 fL Final    MCH 01/16/2025 32.7  26.6 - 33.0 pg Final    MCHC 01/16/2025 34.0  31.5 - 35.7 g/dL Final    RDW 01/16/2025 11.7 (L)  12.3 - 15.4 % Final    RDW-SD 01/16/2025 41.2  37.0 - 54.0 fl Final    MPV 01/16/2025 11.8  6.0 - 12.0 fL Final    Platelets 01/16/2025 114 (L)  140 - 450 10*3/mm3 Final    Neutrophil % 01/16/2025 61.0  42.7 - 76.0 % Final    Lymphocyte % 01/16/2025 25.2  19.6 - 45.3 % Final    Monocyte % 01/16/2025 9.9  5.0 - 12.0 % Final    Eosinophil % 01/16/2025 2.8  0.3 - 6.2 % Final    Basophil % 01/16/2025 0.8  0.0 - 1.5 % Final    Immature Grans % 01/16/2025 0.3  0.0 - 0.5 % Final    Neutrophils, Absolute 01/16/2025 2.40  1.70 - 7.00 10*3/mm3 Final    Lymphocytes, Absolute 01/16/2025 0.99  0.70 - 3.10 10*3/mm3 Final    Monocytes, Absolute 01/16/2025 0.39  0.10 - 0.90 10*3/mm3 Final    Eosinophils, Absolute 01/16/2025 0.11  0.00 - 0.40 10*3/mm3 Final    Basophils, Absolute 01/16/2025 0.03  0.00 - 0.20 10*3/mm3 Final    Immature Grans, Absolute 01/16/2025 0.01  0.00 - 0.05 10*3/mm3 Final    nRBC 01/16/2025 0.0  0.0 - 0.2 /100 WBC Final       Imaging  XR Shoulder 2+ View Left  Result Date: 5/23/2025  Impression: No acute osseous abnormality. Electronically Signed: Alexander Lorenz DO   5/23/2025 4:40 PM EDT  Workstation ID: [id number]      Results  Labs   - Liver function test: Elevated liver enzymes    Imaging   - X-ray of left shoulder: 05/23/2025, No dislocation or broken areas       Procedures       ASSESSMENT & PLAN  Diagnoses and all orders for this visit:    1. Acute pain of left shoulder (Primary)  -     HYDROcodone-acetaminophen (NORCO) 5-325 MG per tablet; Take 1 tablet by mouth Every 12 (Twelve) Hours As Needed for Severe Pain.  Dispense: 20 tablet; Refill: 0  -     MRI Shoulder Left Without Contrast; Future  -     ibuprofen (ADVIL,MOTRIN) 800 MG tablet; Take 1 tablet by mouth Every 6 (Six) Hours As Needed for Mild Pain.  Dispense: 20 tablet; Refill: 0    2. Shoulder injury, left, sequela  -     HYDROcodone-acetaminophen (NORCO) 5-325 MG per tablet; Take 1 tablet by mouth Every 12 (Twelve) Hours As Needed for Severe Pain.  Dispense: 20 tablet; Refill: 0  -     omeprazole (priLOSEC) 40 MG capsule; Take 1 capsule by mouth Daily.  Dispense: 30 capsule; Refill: 0  -     MRI Shoulder Left Without Contrast; Future  -     ibuprofen (ADVIL,MOTRIN) 800 MG tablet; Take 1 tablet by mouth Every 6 (Six) Hours As Needed for Mild Pain.  Dispense: 20 tablet; Refill: 0         Assessment & Plan  1. Left shoulder pain.  - The patient experienced a fall from a horse on Friday, resulting in left shoulder pain. He was seen in the emergency room, where x-rays showed no dislocation or fractures.  - He reports sharp pain, swelling, and limited mobility of the left shoulder. He was given hydrocodone for 3 days, which he has completed. Ibuprofen has been used since then but provides limited pain relief.   - Due to his history of elevated liver enzymes, and the fact that Norco also has Tylenol, Tylenol is to be avoided. A prescription for hydrocodone-5-325 ,  20 tablets will be provided, to be taken twice daily as needed for severe pain. In between doses of hydrocodone, he should take ibuprofen 800 mg every 8 hours  with food.  - An MRI will be ordered to further evaluate the shoulder injury. He is advised to continue wearing the sling provided by the ER and to avoid driving while on hydrocodone. He should also take omeprazole daily to reduce stomach acid while on these medications. If the MRI can be scheduled before his orthopedic appointment on 06/11/2025, it will be done; otherwise, he will follow up with ortho.                  FOLLOW UP  Return if symptoms worsen or fail to improve.  Patient was given instructions and counseling regarding his condition or for health maintenance advice. Please see specific information pulled into the AVS if appropriate.     Patient or patient representative verbalized consent for the use of Ambient Listening during the visit with  JACQUELINE Solares for chart documentation. 5/28/2025  08:32 EDT    JACQUELINE Solares  05/28/25  08:34 EDT

## 2025-06-06 ENCOUNTER — HOSPITAL ENCOUNTER (OUTPATIENT)
Dept: MRI IMAGING | Facility: HOSPITAL | Age: 60
Discharge: HOME | End: 2025-06-06
Admitting: NURSE PRACTITIONER
Payer: COMMERCIAL

## 2025-06-06 DIAGNOSIS — M25.512 ACUTE PAIN OF LEFT SHOULDER: ICD-10-CM

## 2025-06-06 DIAGNOSIS — S49.92XS SHOULDER INJURY, LEFT, SEQUELA: ICD-10-CM

## 2025-06-06 PROCEDURE — 73221 MRI JOINT UPR EXTREM W/O DYE: CPT

## 2025-06-11 ENCOUNTER — OFFICE VISIT (OUTPATIENT)
Dept: ORTHOPEDIC SURGERY | Facility: CLINIC | Age: 60
End: 2025-06-11
Payer: COMMERCIAL

## 2025-06-11 ENCOUNTER — PREP FOR SURGERY (OUTPATIENT)
Dept: OTHER | Facility: HOSPITAL | Age: 60
End: 2025-06-11
Payer: COMMERCIAL

## 2025-06-11 VITALS
HEART RATE: 61 BPM | HEIGHT: 72 IN | OXYGEN SATURATION: 98 % | SYSTOLIC BLOOD PRESSURE: 117 MMHG | BODY MASS INDEX: 32.78 KG/M2 | DIASTOLIC BLOOD PRESSURE: 76 MMHG | WEIGHT: 242 LBS

## 2025-06-11 DIAGNOSIS — M75.122 COMPLETE TEAR OF LEFT ROTATOR CUFF, UNSPECIFIED WHETHER TRAUMATIC: ICD-10-CM

## 2025-06-11 DIAGNOSIS — M75.122 COMPLETE TEAR OF LEFT ROTATOR CUFF, UNSPECIFIED WHETHER TRAUMATIC: Primary | ICD-10-CM

## 2025-06-11 DIAGNOSIS — M25.512 LEFT SHOULDER PAIN, UNSPECIFIED CHRONICITY: Primary | ICD-10-CM

## 2025-06-11 PROBLEM — M75.102 ROTATOR CUFF TEAR, LEFT: Status: ACTIVE | Noted: 2025-06-11

## 2025-06-11 NOTE — PROGRESS NOTES
"Chief Complaint  Initial Evaluation of the Left Shoulder     Subjective      Sherly Jo presents to [facility] for initial evaluation of the left shoulder.  He had no problems with the left shoulder until he fell off a horse.  The injury was 5/23/25.  He has limited AROM of the shoulder.  He had X rays and MRI of the left shoulder.  He has pain with movement of the shoulder in any direction.  He has pain with forward and upward ROM and notable decrease strength.      Allergies   Allergen Reactions    Codeine Anaphylaxis and Unknown - High Severity        Social History     Socioeconomic History    Marital status:    Tobacco Use    Smoking status: Never    Smokeless tobacco: Former   Vaping Use    Vaping status: Never Used   Substance and Sexual Activity    Alcohol use: Yes     Comment: CURRENT SOME DAY    Drug use: Never    Sexual activity: Defer        I reviewed the patient's chief complaint, history of present illness, review of systems, past medical history, surgical history, family history, social history, medications, and allergy list.     Review of Systems     Constitutional: Denies fevers, chills, weight loss  Cardiovascular: Denies chest pain, shortness of breath  Skin: Denies rashes, acute skin changes  Neurologic: Denies headache, loss of consciousness        Vital Signs:   /76   Pulse 61   Ht 182.9 cm (72.01\")   Wt 110 kg (242 lb)   SpO2 98%   BMI 32.81 kg/m²          Physical Exam  General: Alert. No acute distress    Ortho Exam        LEFT SHOULDER Forward flexion 80 AROM 170 PROM. Abduction 40 AROM 100 PROM. External rotation 30. Internal rotation side of hip. Positive Cross body adduction. Supraspinatus strength 3/5. Infraspinatus Strength 3/5. Infrared subscap 3/5. Positive Powers. Positive Neer. Negative Apprehension. Negative Lift off. (Negative Obriens. Sensation intact to light touch, median, radial, ulnar nerve. Positive AIN, PIN, " ulnar nerve motor. Positive pulses. Positive Impingement signs. Good strength in triceps, biceps, deltoid, wrist extensors and wrist flexors. Tender to palpation to the anterior aspect of the shoulder, the AC joint and the bicipital groove. Positive drop arm.  Positive empty can test.  Decrease strength with internal and external rotation.        Procedures        Imaging Results (Most Recent)       None             Result Review :         MRI Shoulder Left Without Contrast  Result Date: 6/10/2025  Narrative: MRI SHOULDER LEFT WO CONTRAST Date of Exam: 6/6/2025 11:05 AM EDT Indication: fall.  Comparison: 3 view left shoulder dated 5/23/2025 Technique:  Routine multiplanar/multisequence images of the left shoulder were obtained without contrast administration.  Findings: No fracture or malalignment is identified. Marrow signal appears normal. Moderate acromioclavicular osteoarthritis is noted. A trace AC joint effusion is noted. A type II acromion is present. There is marked attenuation of the supraspinatus tendon. There is a full-thickness tear at the junction of the supraspinatus and infraspinatus tendons measuring 2.2 cm AP and demonstrating approximately 2.1 cm tendon retraction. Moderate subscapularis tendinosis is noted. The teres minor appears unremarkable. No significant muscle body atrophy or signal alteration is noted surrounding the shoulder girdle. The biceps long head tendon and its attachment to the superior labrum are intact. There is a small tear of the mid anterior labrum. Degenerative changes are noted elsewhere in the labrum. A moderate glenohumeral joint effusion is noted. Cartilage along the inferomedial aspect of the humeral head is markedly thinned. A 1.0 cm loose body is noted in the subscapular recess.     Impression: Impression: 1.Mild glenohumeral and moderate acromioclavicular osteoarthritis. 2.Full-thickness tear of the conjoined supraspinatus/infraspinatus tendon, as above. Marked  attenuation of the supraspinatus tendon more anteriorly 3.Tiny tear of the mid anterior labrum. 4.Moderate glenohumeral joint effusion with 1.0 cm loose body Electronically Signed: Tyler Kaminski MD  6/10/2025 9:03 AM EDT  Workstation ID: [id number]    XR Shoulder 2+ View Left  Result Date: 5/23/2025  Narrative: XR SHOULDER 2+ VW LEFT Date of Exam: 5/23/2025 4:28 PM EDT Indication: injury Comparison: None available. Findings: No acute fracture or dislocation. Mild to moderate degenerative changes of the glenohumeral and AC joints. No suspicious erosive changes. The visualized soft tissue is unremarkable. The visualized lungs are clear.     Impression: Impression: No acute osseous abnormality. Electronically Signed: Alexander Lorenz DO  5/23/2025 4:40 PM EDT  Workstation ID: [id number]             Assessment and Plan     Diagnoses and all orders for this visit:    1. Left shoulder pain, unspecified chronicity (Primary)    2. Complete tear of left rotator cuff, unspecified whether traumatic        Discussed the treatment plan with the patient. I reviewed the MRI results with the patient. I reviewed the X-rays that were obtained 5/23/25 with the patient.     Discussed the treatment options with the patient, operative vs non-operative. The patient expressed understanding and wished to proceed with a left shoulder arthroscopy with rotator cuff repair, subacromial decompression and distal claviculectomy with possible biceps tenodesis vs tenotomy.        Discussed surgery., Risks/benefits discussed with patient including, but not limited to: infection, bleeding, neurovascular damage, re-rupture, aesthetic deformity, need for further surgery, and death., Surgery pamphlet given., and Call or return if worsening symptoms.    Follow Up     2 weeks postoperatively       Patient was given instructions and counseling regarding his condition or for health maintenance advice. Please see specific information pulled into the AVS if  appropriate.     Scribed for Jonnathan Alegria MD by Caty Arriola MA.  06/11/25   07:26 EDT    I have personally performed the services described in this document as scribed by the above individual and it is both accurate and complete. Jonnathan Alegria MD 06/11/25

## 2025-06-22 DIAGNOSIS — E55.9 VITAMIN D DEFICIENCY: ICD-10-CM

## 2025-06-23 RX ORDER — ERGOCALCIFEROL 1.25 MG/1
CAPSULE, LIQUID FILLED ORAL
Qty: 10 CAPSULE | Refills: 1 | Status: SHIPPED | OUTPATIENT
Start: 2025-06-23

## 2025-06-26 DIAGNOSIS — S49.92XS SHOULDER INJURY, LEFT, SEQUELA: ICD-10-CM

## 2025-06-26 RX ORDER — OMEPRAZOLE 40 MG/1
40 CAPSULE, DELAYED RELEASE ORAL DAILY
Qty: 30 CAPSULE | Refills: 5 | Status: SHIPPED | OUTPATIENT
Start: 2025-06-26

## 2025-07-07 ENCOUNTER — TELEPHONE (OUTPATIENT)
Dept: ORTHOPEDIC SURGERY | Facility: CLINIC | Age: 60
End: 2025-07-07
Payer: COMMERCIAL

## 2025-07-07 NOTE — TELEPHONE ENCOUNTER
SPOKE WITH PATIENT, PATIENT WAS INFORMED THE NEXT AVAILABLE DATES AFTER 7/17 ARE GOING TO BE 7/24 OR 7/31. PATIENT STATED HE NEEDED TO MAKE A PHONE CALL AND WILL RETURN MY CALL BACK TO POSSIBLE RS SX.

## 2025-07-07 NOTE — TELEPHONE ENCOUNTER
Hub staff attempted to follow warm transfer process and was unsuccessful     Caller: Sherly Jo    Relationship to patient: Self    Best call back number: [phone]    Patient is needing: PT CALLING TO SPEAK WITH MICHEAL AGAIN IN RELATION TO TE FROM 07/07 TO RESCHEDULE SX WITH DR YORK. PLEASE CONTACT PT TO DISCUSS.

## 2025-07-07 NOTE — TELEPHONE ENCOUNTER
SPOKE W/PATIENT, PATIENT STATES HE SPOKE W/HIS GIRLFRIEND AND THEY WISH TO KEEP SX ON THE SCHEDULE FOR 7/17/2025. PATIENT WAS INFORMED THAT THE HOSPITAL WILL CALL THE DAY PRIOR WITH THE ARRIVAL TIME. PATIENT VERBALIZED UNDERSTANDING.

## 2025-07-07 NOTE — TELEPHONE ENCOUNTER
Caller: Sherly Jo    Relationship: Self    Best call back number: [phone]    What was the call regarding: PT CALLING TO DISCUSS POSSIBLY RESCHEDULING L SHOULDER SURGERY WITH DR YORK. PT WOULD LIKE TO SEE HOW SOON AFTER JULY 17 PT CAN HAVE THE SURGERY. PLEASE CONTACT PT TO DISCUSS.

## 2025-07-15 NOTE — PRE-PROCEDURE INSTRUCTIONS
PATIENT INSTRUCTED TO BE:    - NOTHING TO EAT AFTER MIDNIGHT OR CHEW, EXCEPT CAN HAVE SIPS OF WATER WITH MEDICATIONS OR CLEAR LIQUIDS 2 HOURS PRIOR TO SURGERY ARRIVAL TIME , NO MORE THAN 8 OZ. (NOTHING RED)     - TO HOLD ALL VITAMINS, SUPPLEMENTS, NSAIDS FOR ONE WEEK PRIOR TO THEIR SURGICAL PROCEDURE    - DO NOT TAKE ______________________ 7 DAYS PRIOR TO PROCEDURE PER ANESTHESIA RECOMMENDATIONS/INSTRUCTIONS     - INSTRUCTED PT TO USE SURGICAL SOAP 1 TIME THE NIGHT PRIOR TO SURGERY ___________ OR THE AM OF SURGERY _____________   USE THE SOAP FROM NECK TO TOES, AVOID THEIR FACE, HAIR, AND PRIVATE PARTS. IF USE THE SOAP THE NIGHT PRIOR TO SURGERY, CHANGE BED LINENS AND NO PETS IN THE BED.     INSTRUCTED NO LOTIONS, JEWELRY, PIERCINGS,  NAIL POLISH, OR DEODORANT DAY OF SURGERY    - IF DIABETIC, CHECK BLOOD GLUCOSE IF LESS THAN 70 OR HAVING SYMPTOMS CALL THE PREOP AREA FOR INSTRUCTIONS ON AM OF SURGERY ([phone])    -INSTRUCTED TO TAKE THE FOLLOWING MEDICATIONS THE DAY OF SURGERY WITH SIPS OF WATER:           - DO NOT BRING ANY MEDICATIONS WITH YOU TO THE HOSPITAL THE DAY OF SURGERY, EXCEPT IF USE INHALERS. BRING INHALERS DAY OF SURGERY       - BRING CPAP OR BIPAP TO THE HOSPITAL ONLY IF YOU ARE SPENDING THE NIGHT    - DO NOT SMOKE OR VAPE 24 HOURS PRIOR TO PROCEDURE PER ANESTHESIA REQUEST     -MAKE SURE YOU HAVE A RIDE HOME OR SOMEONE TO STAY WITH YOU THE DAY OF THE PROCEDURE AFTER YOU GO HOME     - FOLLOW ANY OTHER INSTRUCTIONS GIVEN TO YOU BY YOUR SURGEON'S OFFICE.     - DAY OF SURGERY __________ AT [facility] ([facility]),  PARK IN THE OPEN LOT, COME TO ENTRANCE / [facility], FIRST FLOOR, CHECK IN AT THE DESK FOR REGISTRATION/ SURGERY      - YOU WILL RECEIVE A PHONE CALL THE DAY PRIOR TO SURGERY BETWEEN 1PM AND 4 PM WITH ARRIVAL TIME, IF YOUR SURGERY IS ON A MONDAY YOU WILL RECEIVE A CALL THE FRIDAY PRIOR TO SURGERY DATE    - BRING CASH OR CREDIT CARD FOR COPAYMENT OF MEDICATIONS  AFTER SURGERY IF YOU USE THE HOSPITAL PHARMACY (MED[facility] TO BED)    - PREADMISSION TESTING NURSE [phone] IF HAVE ANY QUESTIONS     -PATIENT PROVIDED THE NUMBER FOR PREOP SURGICAL DEPT IF HAD QUESTIONS AFTER HOURS PRIOR TO SURGERY  [phone]).  INFORMED PT IF NO ANSWER, LEAVE A MESSAGE AND SOMEONE WILL RETURN THEIR CALL       PATIENT VERBALIZED UNDERSTANDING

## 2025-07-16 ENCOUNTER — ANESTHESIA EVENT (OUTPATIENT)
Dept: PERIOP | Facility: HOSPITAL | Age: 60
End: 2025-07-16
Payer: COMMERCIAL

## 2025-07-17 ENCOUNTER — ANESTHESIA (OUTPATIENT)
Dept: PERIOP | Facility: HOSPITAL | Age: 60
End: 2025-07-17
Payer: COMMERCIAL

## 2025-07-17 ENCOUNTER — TELEPHONE (OUTPATIENT)
Dept: ORTHOPEDIC SURGERY | Facility: CLINIC | Age: 60
End: 2025-07-17
Payer: COMMERCIAL

## 2025-07-17 NOTE — TELEPHONE ENCOUNTER
PATIENT CALLED TO RESCHEDULE SURGERY THAT WAS CANCELED FOR TODAY AT SURGEONS REQUEST.PATIENT RESCHEDULED SURGERY FOR 07/24/2025.PATIENT INFORMED HOSPITAL WILL CALL 07/23/2025 WITH ARRIVAL TIME .PATIENT VOICED UNDERSTANDING .   Olumiant Counseling: I discussed with the patient the risks of Olumiant therapy including but not limited to upper respiratory tract infections, shingles, cold sores, and nausea. Live vaccines should be avoided.  This medication has been linked to serious infections; higher rate of mortality; malignancy and lymphoproliferative disorders; major adverse cardiovascular events; thrombosis; gastrointestinal perforations; neutropenia; lymphopenia; anemia; liver enzyme elevations; and lipid elevations.

## 2025-07-18 NOTE — NURSING NOTE
SURGERY RESCHEDULED TO 7/24/25 PT CALLED AND MED INSTRUCTIONS UPDATED. PT VERBALIZED UNDERSTANDING.

## 2025-07-24 ENCOUNTER — ANESTHESIA EVENT CONVERTED (OUTPATIENT)
Dept: ANESTHESIOLOGY | Facility: HOSPITAL | Age: 60
End: 2025-07-24
Payer: COMMERCIAL

## 2025-07-24 ENCOUNTER — HOSPITAL ENCOUNTER (OUTPATIENT)
Facility: HOSPITAL | Age: 60
Discharge: HOME | End: 2025-07-24
Attending: ORTHOPAEDIC SURGERY | Admitting: ORTHOPAEDIC SURGERY
Payer: COMMERCIAL

## 2025-07-24 VITALS
RESPIRATION RATE: 16 BRPM | BODY MASS INDEX: 31.92 KG/M2 | HEART RATE: 55 BPM | OXYGEN SATURATION: 96 % | TEMPERATURE: 97 F | WEIGHT: 235.67 LBS | HEIGHT: 72 IN | DIASTOLIC BLOOD PRESSURE: 73 MMHG | SYSTOLIC BLOOD PRESSURE: 96 MMHG

## 2025-07-24 DIAGNOSIS — S46.012D TRAUMATIC COMPLETE TEAR OF LEFT ROTATOR CUFF, SUBSEQUENT ENCOUNTER: Primary | ICD-10-CM

## 2025-07-24 DIAGNOSIS — M75.122 COMPLETE TEAR OF LEFT ROTATOR CUFF, UNSPECIFIED WHETHER TRAUMATIC: ICD-10-CM

## 2025-07-24 LAB
QT INTERVAL: 429 MS
QTC INTERVAL: 423 MS

## 2025-07-24 PROCEDURE — 25010000002 CEFAZOLIN PER 500 MG: Performed by: ORTHOPAEDIC SURGERY

## 2025-07-24 PROCEDURE — C1713 ANCHOR/SCREW BN/BN,TIS/BN: HCPCS | Performed by: ORTHOPAEDIC SURGERY

## 2025-07-24 PROCEDURE — S0260 H&P FOR SURGERY: HCPCS | Performed by: ORTHOPAEDIC SURGERY

## 2025-07-24 PROCEDURE — 25010000002 MIDAZOLAM PER 1MG: Performed by: ANESTHESIOLOGY

## 2025-07-24 PROCEDURE — 25010000002 SUGAMMADEX 200 MG/2ML SOLUTION: Performed by: NURSE ANESTHETIST, CERTIFIED REGISTERED

## 2025-07-24 PROCEDURE — 25010000002 FENTANYL CITRATE (PF) 50 MCG/ML SOLUTION: Performed by: NURSE ANESTHETIST, CERTIFIED REGISTERED

## 2025-07-24 PROCEDURE — 25010000002 HYDROMORPHONE 1 MG/ML SOLUTION: Performed by: NURSE ANESTHETIST, CERTIFIED REGISTERED

## 2025-07-24 PROCEDURE — 25010000002 PROPOFOL 10 MG/ML EMULSION: Performed by: NURSE ANESTHETIST, CERTIFIED REGISTERED

## 2025-07-24 PROCEDURE — 25010000002 KETOROLAC TROMETHAMINE PER 15 MG: Performed by: NURSE ANESTHETIST, CERTIFIED REGISTERED

## 2025-07-24 PROCEDURE — 25810000003 LACTATED RINGERS PER 1000 ML: Performed by: ANESTHESIOLOGY

## 2025-07-24 PROCEDURE — 25010000002 DEXAMETHASONE PER 1 MG: Performed by: NURSE ANESTHETIST, CERTIFIED REGISTERED

## 2025-07-24 PROCEDURE — 29824 SHO ARTHRS SRG DSTL CLAVICLC: CPT | Performed by: ORTHOPAEDIC SURGERY

## 2025-07-24 PROCEDURE — 93005 ELECTROCARDIOGRAM TRACING: CPT | Performed by: ORTHOPAEDIC SURGERY

## 2025-07-24 PROCEDURE — 23410 REPAIR ROTATOR CUFF ACUTE: CPT | Performed by: ORTHOPAEDIC SURGERY

## 2025-07-24 PROCEDURE — 25010000002 LIDOCAINE PF 2% 2 % SOLUTION: Performed by: NURSE ANESTHETIST, CERTIFIED REGISTERED

## 2025-07-24 PROCEDURE — 25010000002 ONDANSETRON PER 1 MG: Performed by: NURSE ANESTHETIST, CERTIFIED REGISTERED

## 2025-07-24 DEVICE — SUT/ANCH BIOCOMP CORKSCREW FUL/THRD SUTURETAPE 5.5X14.7MM: Type: IMPLANTABLE DEVICE | Site: SHOULDER | Status: FUNCTIONAL

## 2025-07-24 DEVICE — SUT FW 2/0 38IN 1BLU 1BLK/WHT  AR7201: Type: IMPLANTABLE DEVICE | Site: SHOULDER | Status: FUNCTIONAL

## 2025-07-24 DEVICE — SUT/ANCH BIOCOMP SWIVELOCK KNOTLSS NMBR2/BLU 4.75X19.1MM: Type: IMPLANTABLE DEVICE | Site: SHOULDER | Status: FUNCTIONAL

## 2025-07-24 RX ORDER — KETOROLAC TROMETHAMINE 15 MG/ML
INJECTION, SOLUTION INTRAMUSCULAR; INTRAVENOUS AS NEEDED
Status: DISCONTINUED | OUTPATIENT
Start: 2025-07-24 | End: 2025-07-24 | Stop reason: SURG

## 2025-07-24 RX ORDER — SODIUM CHLORIDE, SODIUM LACTATE, POTASSIUM CHLORIDE, CALCIUM CHLORIDE 600; 310; 30; 20 MG/100ML; MG/100ML; MG/100ML; MG/100ML
9 INJECTION, SOLUTION INTRAVENOUS CONTINUOUS PRN
Status: DISCONTINUED | OUTPATIENT
Start: 2025-07-24 | End: 2025-07-24 | Stop reason: HOSPADM

## 2025-07-24 RX ORDER — OXYCODONE AND ACETAMINOPHEN 7.5; 325 MG/1; MG/1
1 TABLET ORAL EVERY 4 HOURS PRN
Qty: 30 TABLET | Refills: 0 | Status: SHIPPED | OUTPATIENT
Start: 2025-07-24

## 2025-07-24 RX ORDER — PROPOFOL 10 MG/ML
VIAL (ML) INTRAVENOUS AS NEEDED
Status: DISCONTINUED | OUTPATIENT
Start: 2025-07-24 | End: 2025-07-24 | Stop reason: SURG

## 2025-07-24 RX ORDER — MIDAZOLAM HYDROCHLORIDE 2 MG/2ML
3 INJECTION, SOLUTION INTRAMUSCULAR; INTRAVENOUS ONCE
Status: COMPLETED | OUTPATIENT
Start: 2025-07-24 | End: 2025-07-24

## 2025-07-24 RX ORDER — ONDANSETRON 2 MG/ML
INJECTION INTRAMUSCULAR; INTRAVENOUS AS NEEDED
Status: DISCONTINUED | OUTPATIENT
Start: 2025-07-24 | End: 2025-07-24 | Stop reason: SURG

## 2025-07-24 RX ORDER — LIDOCAINE HYDROCHLORIDE 20 MG/ML
INJECTION, SOLUTION EPIDURAL; INFILTRATION; INTRACAUDAL; PERINEURAL AS NEEDED
Status: DISCONTINUED | OUTPATIENT
Start: 2025-07-24 | End: 2025-07-24 | Stop reason: SURG

## 2025-07-24 RX ORDER — ACETAMINOPHEN 500 MG
1000 TABLET ORAL ONCE
Status: COMPLETED | OUTPATIENT
Start: 2025-07-24 | End: 2025-07-24

## 2025-07-24 RX ORDER — BUPIVACAINE HYDROCHLORIDE AND EPINEPHRINE 5; 5 MG/ML; UG/ML
INJECTION, SOLUTION EPIDURAL; INTRACAUDAL; PERINEURAL
Status: COMPLETED | OUTPATIENT
Start: 2025-07-24 | End: 2025-07-24

## 2025-07-24 RX ORDER — PROMETHAZINE HYDROCHLORIDE 12.5 MG/1
25 TABLET ORAL ONCE AS NEEDED
Status: DISCONTINUED | OUTPATIENT
Start: 2025-07-24 | End: 2025-07-24 | Stop reason: HOSPADM

## 2025-07-24 RX ORDER — PROMETHAZINE HYDROCHLORIDE 25 MG/1
25 SUPPOSITORY RECTAL ONCE AS NEEDED
Status: DISCONTINUED | OUTPATIENT
Start: 2025-07-24 | End: 2025-07-24 | Stop reason: HOSPADM

## 2025-07-24 RX ORDER — FENTANYL CITRATE 50 UG/ML
INJECTION, SOLUTION INTRAMUSCULAR; INTRAVENOUS AS NEEDED
Status: DISCONTINUED | OUTPATIENT
Start: 2025-07-24 | End: 2025-07-24 | Stop reason: SURG

## 2025-07-24 RX ORDER — DEXAMETHASONE SODIUM PHOSPHATE 4 MG/ML
INJECTION, SOLUTION INTRA-ARTICULAR; INTRALESIONAL; INTRAMUSCULAR; INTRAVENOUS; SOFT TISSUE AS NEEDED
Status: DISCONTINUED | OUTPATIENT
Start: 2025-07-24 | End: 2025-07-24 | Stop reason: SURG

## 2025-07-24 RX ORDER — ONDANSETRON 2 MG/ML
4 INJECTION INTRAMUSCULAR; INTRAVENOUS ONCE AS NEEDED
Status: DISCONTINUED | OUTPATIENT
Start: 2025-07-24 | End: 2025-07-24 | Stop reason: HOSPADM

## 2025-07-24 RX ORDER — OXYCODONE HYDROCHLORIDE 5 MG/1
5 TABLET ORAL
Status: COMPLETED | OUTPATIENT
Start: 2025-07-24 | End: 2025-07-24

## 2025-07-24 RX ORDER — ROCURONIUM BROMIDE 10 MG/ML
INJECTION, SOLUTION INTRAVENOUS AS NEEDED
Status: DISCONTINUED | OUTPATIENT
Start: 2025-07-24 | End: 2025-07-24 | Stop reason: SURG

## 2025-07-24 RX ADMIN — SODIUM CHLORIDE 2 G: 9 INJECTION, SOLUTION INTRAVENOUS at 12:07

## 2025-07-24 RX ADMIN — SODIUM CHLORIDE, POTASSIUM CHLORIDE, SODIUM LACTATE AND CALCIUM CHLORIDE 9 ML/HR: 600; 310; 30; 20 INJECTION, SOLUTION INTRAVENOUS at 10:05

## 2025-07-24 RX ADMIN — DEXAMETHASONE SODIUM PHOSPHATE 4 MG: 4 INJECTION, SOLUTION INTRAMUSCULAR; INTRAVENOUS at 12:17

## 2025-07-24 RX ADMIN — HYDROMORPHONE HYDROCHLORIDE 0.5 MG: 1 INJECTION, SOLUTION INTRAMUSCULAR; INTRAVENOUS; SUBCUTANEOUS at 13:55

## 2025-07-24 RX ADMIN — OXYCODONE 5 MG: 5 TABLET ORAL at 13:42

## 2025-07-24 RX ADMIN — LIDOCAINE HYDROCHLORIDE 50 MG: 20 INJECTION, SOLUTION INTRAVENOUS at 12:02

## 2025-07-24 RX ADMIN — KETOROLAC TROMETHAMINE 30 MG: 15 INJECTION, SOLUTION INTRAMUSCULAR; INTRAVENOUS at 13:17

## 2025-07-24 RX ADMIN — PROPOFOL 200 MG: 10 INJECTION, EMULSION INTRAVENOUS at 12:02

## 2025-07-24 RX ADMIN — OXYCODONE 5 MG: 5 TABLET ORAL at 13:57

## 2025-07-24 RX ADMIN — HYDROMORPHONE HYDROCHLORIDE 0.5 MG: 1 INJECTION, SOLUTION INTRAMUSCULAR; INTRAVENOUS; SUBCUTANEOUS at 13:45

## 2025-07-24 RX ADMIN — FENTANYL CITRATE 100 MCG: 50 INJECTION, SOLUTION INTRAMUSCULAR; INTRAVENOUS at 12:02

## 2025-07-24 RX ADMIN — ACETAMINOPHEN 1000 MG: 500 TABLET ORAL at 10:04

## 2025-07-24 RX ADMIN — ROCURONIUM BROMIDE 50 MG: 10 INJECTION, SOLUTION INTRAVENOUS at 12:02

## 2025-07-24 RX ADMIN — ONDANSETRON 4 MG: 2 INJECTION INTRAMUSCULAR; INTRAVENOUS at 13:05

## 2025-07-24 RX ADMIN — SUGAMMADEX 200 MG: 100 INJECTION, SOLUTION INTRAVENOUS at 13:28

## 2025-07-24 RX ADMIN — BUPIVACAINE HYDROCHLORIDE AND EPINEPHRINE BITARTRATE 30 ML: 5; .005 INJECTION, SOLUTION EPIDURAL; INTRACAUDAL; PERINEURAL at 11:01

## 2025-07-24 RX ADMIN — MIDAZOLAM HYDROCHLORIDE 3 MG: 1 INJECTION, SOLUTION INTRAMUSCULAR; INTRAVENOUS at 10:57

## 2025-07-24 NOTE — ANESTHESIA PREPROCEDURE EVALUATION
Anesthesia Evaluation     Patient summary reviewed and Nursing notes reviewed   no history of anesthetic complications:   NPO Solid Status: > 8 hours  NPO Liquid Status: > 2 hours           Airway   Mallampati: II  TM distance: >3 FB  Neck ROM: full  No difficulty expected  Dental      Pulmonary - normal exam    breath sounds clear to auscultation  (+) ,sleep apnea  Cardiovascular - negative cardio ROS and normal exam  Exercise tolerance: good (4-7 METS)    Rhythm: regular  Rate: normal        Neuro/Psych  (+) headaches, numbness, psychiatric history Depression  GI/Hepatic/Renal/Endo    (+) obesity, GERD well controlled    Musculoskeletal     Abdominal    Substance History - negative use     OB/GYN negative ob/gyn ROS         Other   arthritis,     ROS/Med Hx Other: PAT Nursing Notes unavailable.               Anesthesia Plan    ASA 2     general with block     (Patient understands anesthesia not responsible for dental damage.  H/o trach years ago, non issues now, may need smaller ETT)  intravenous induction     Anesthetic plan, risks, benefits, and alternatives have been provided, discussed and informed consent has been obtained with: patient.  Pre-procedure education provided  Plan discussed with CRNA.    CODE STATUS:

## 2025-07-24 NOTE — H&P
"Chief Complaint  Initial Evaluation of the Left Shoulder     Subjective  Sherly Jo presents to [facility] for initial evaluation of the left shoulder.  He had no problems with the left shoulder until he fell off a horse.  The injury was 5/23/25.  He has limited AROM of the shoulder.  He had X rays and MRI of the left shoulder.  He has pain with movement of the shoulder in any direction.  He has pain with forward and upward ROM and notable decrease strength.       Allergies        Allergies   Allergen Reactions    Codeine Anaphylaxis and Unknown - High Severity            Social History   Social History            Socioeconomic History    Marital status:    Tobacco Use    Smoking status: Never    Smokeless tobacco: Former   Vaping Use    Vaping status: Never Used   Substance and Sexual Activity    Alcohol use: Yes       Comment: CURRENT SOME DAY    Drug use: Never    Sexual activity: Defer            I reviewed the patient's chief complaint, history of present illness, review of systems, past medical history, surgical history, family history, social history, medications, and allergy list.      Review of Systems      Constitutional: Denies fevers, chills, weight loss  Cardiovascular: Denies chest pain, shortness of breath  Skin: Denies rashes, acute skin changes  Neurologic: Denies headache, loss of consciousness           Vital Signs:   /76   Pulse 61   Ht 182.9 cm (72.01\")   Wt 110 kg (242 lb)   SpO2 98%   BMI 32.81 kg/m²           Physical Exam  General: Alert. No acute distress     Ortho Exam          LEFT SHOULDER Forward flexion 80 AROM 170 PROM. Abduction 40 AROM 100 PROM. External rotation 30. Internal rotation side of hip. Positive Cross body adduction. Supraspinatus strength 3/5. Infraspinatus Strength 3/5. Infrared subscap 3/5. Positive Powers. Positive Neer. Negative Apprehension. Negative Lift off. (Negative Obriens. Sensation intact to light " touch, median, radial, ulnar nerve. Positive AIN, PIN, ulnar nerve motor. Positive pulses. Positive Impingement signs. Good strength in triceps, biceps, deltoid, wrist extensors and wrist flexors. Tender to palpation to the anterior aspect of the shoulder, the AC joint and the bicipital groove. Positive drop arm.  Positive empty can test.  Decrease strength with internal and external rotation.          Procedures           Imaging Results (Most Recent)         None                Result Review  :            MRI Shoulder Left Without Contrast  Result Date: 6/10/2025  Narrative: MRI SHOULDER LEFT WO CONTRAST Date of Exam: 6/6/2025 11:05 AM EDT Indication: fall.  Comparison: 3 view left shoulder dated 5/23/2025 Technique:  Routine multiplanar/multisequence images of the left shoulder were obtained without contrast administration.  Findings: No fracture or malalignment is identified. Marrow signal appears normal. Moderate acromioclavicular osteoarthritis is noted. A trace AC joint effusion is noted. A type II acromion is present. There is marked attenuation of the supraspinatus tendon. There is a full-thickness tear at the junction of the supraspinatus and infraspinatus tendons measuring 2.2 cm AP and demonstrating approximately 2.1 cm tendon retraction. Moderate subscapularis tendinosis is noted. The teres minor appears unremarkable. No significant muscle body atrophy or signal alteration is noted surrounding the shoulder girdle. The biceps long head tendon and its attachment to the superior labrum are intact. There is a small tear of the mid anterior labrum. Degenerative changes are noted elsewhere in the labrum. A moderate glenohumeral joint effusion is noted. Cartilage along the inferomedial aspect of the humeral head is markedly thinned. A 1.0 cm loose body is noted in the subscapular recess.      Impression: Impression: 1.Mild glenohumeral and moderate acromioclavicular osteoarthritis. 2.Full-thickness tear of the  conjoined supraspinatus/infraspinatus tendon, as above. Marked attenuation of the supraspinatus tendon more anteriorly 3.Tiny tear of the mid anterior labrum. 4.Moderate glenohumeral joint effusion with 1.0 cm loose body Electronically Signed: Tyler Kaminski MD  6/10/2025 9:03 AM EDT  Workstation ID: [id number]     XR Shoulder 2+ View Left  Result Date: 5/23/2025  Narrative: XR SHOULDER 2+ VW LEFT Date of Exam: 5/23/2025 4:28 PM EDT Indication: injury Comparison: None available. Findings: No acute fracture or dislocation. Mild to moderate degenerative changes of the glenohumeral and AC joints. No suspicious erosive changes. The visualized soft tissue is unremarkable. The visualized lungs are clear.      Impression: Impression: No acute osseous abnormality. Electronically Signed: Alexander oLrenz DO  5/23/2025 4:40 PM EDT  Workstation ID: [id number]           Assessment  Assessment and Plan      Diagnoses and all orders for this visit:     1. Left shoulder pain, unspecified chronicity (Primary)     2. Complete tear of left rotator cuff, unspecified whether traumatic           Discussed the treatment plan with the patient. I reviewed the MRI results with the patient. I reviewed the X-rays that were obtained 5/23/25 with the patient.      Discussed the treatment options with the patient, operative vs non-operative. The patient expressed understanding and wished to proceed with a left shoulder arthroscopy with rotator cuff repair, subacromial decompression and distal claviculectomy with possible biceps tenodesis vs tenotomy.          Discussed surgery., Risks/benefits discussed with patient including, but not limited to: infection, bleeding, neurovascular damage, re-rupture, aesthetic deformity, need for further surgery, and death., Surgery pamphlet given., and Call or return if worsening symptoms.     Follow Up      2 weeks postoperatively

## 2025-07-24 NOTE — OP NOTE
SHOULDER ARTHROSCOPY WITH ROTATOR CUFF REPAIR  Procedure Report    Patient Name:  Sherly Jo  YOB: 1965    Date of Surgery:  7/24/2025       Pre-op Diagnosis:   Complete tear of left rotator cuff, unspecified whether traumatic [M75.122]       Post-Op Diagnosis Codes:     * Complete tear of left rotator cuff, unspecified whether traumatic [M75.122]    Procedure:  Procedure(s):  LEFT SHOULDER ARTHROSCOPY WITH ROTATOR CUFF REPAIR SUBACROMIAL DECOMPRESSION DISTAL CLAVICULECTOMY    Staff:  Surgeon(s):  Jonnathan Alegria MD    Assistant: Matty Zepeda    Anesthesia: General    Estimated Blood Loss: 25 mL    Implants:    Implant Name Type Inv. Item Serial No.  Lot No. LRB No. Used Action   SUT/ANCH BIOCOMP CORKSCREW FUL/THRD SUTURETAPE 5.5X14.7MM - [id number] Implant SUT/ANCH BIOCOMP CORKSCREW FUL/THRD SUTURETAPE 5.5X14.7MM  [id number] Left 3 Implanted   SUT FW 2/0 38IN 1BLU 1BLK/WHT  [id number] - [id number] Implant SUT FW 2/0 38IN 1BLU 1BLK/WHT  [id number]  [employer] [id number] Left 1 Implanted   SUT/ANCH BIOCOMP SWIVELOCK KNOTLSS NMBR2/OSCAR 4.75X19.1MM - [id number] Implant SUT/ANCH BIOCOMP SWIVELOCK KNOTLSS NMBR2/OSCAR 4.75X19.1MM  [employer] [id number] Left 2 Implanted   SUT/ANCH BIOCOMP SWIVELOCK KNOTLSS NMBR2/OSCAR 4.75X19.1MM - [id number] Implant SUT/ANCH BIOCOMP SWIVELOCK KNOTLSS NMBR2/OSCAR 4.75X19.1MM  [employer] [id number] Left 1 Implanted       Specimen:          None      Complications: None    Description of Procedure:   The patient was taken to the operating room and placed supine on the operating table after interscalene block was done in preoperative holding. After general endotracheal anesthesia was established the shoulder was examined and there was full range of motion and no instability. The patient was placed in the right lateral decubitus position with axillary roll and well-padded down lower extremity on a beanbag. The beanbag was deflated, and the left shoulder and upper extremity  were prepped and draped in standard fashion using alcohol and ChloraPrep. A standard posterolateral portal was established with a stab incision. The blunt was entered into the glenohumeral joint atraumatically and an anterosuperior portal was established under direct visualization. Thorough examination of the shoulder ensued. The glenohumeral cartilage was well maintained. The biceps was intact and the labrum was intact. There were no loose bodies or synovitis. There was no significant chondromalacia of the glenohumeral joint. There was evidence of a full massive thickness supraspinatus rotator cuff tear. The camera was placed in the subacromial space. The bursectomy was completed through a lateral portal. The coracoacromial ligament was frayed and released with a VAPR wand. The undersurface of the acromion was prepared for acromioplasty using a VAPR wand and carried out using a bur and checked using the cutting block technique. The distal clavicle showed significant signs of AC arthrosis and 8-10 mm of bone was removed from the distal clavicle using a bur. The bed of the rotator cuff was prepared and debrided with a shaver. After preparing the rotator cuff bed for repair, the mini-open rotator cuff repair ensued using multiple bioabsorbable Arthrex anchor with double limb FiberWire and a convergent suture. The suture was passed with a scorpion needle through the rotator cuff in an alternating simple and horizontal mattress fashion and tied down and incorporated to a double row repair with a swivel locks A stout repair of the rotator cuff was achieved. The wound was copiously irrigated. The deltoid was closed with a few figure-of-eight 0 Vicryl, deep fat with 0 Vicryl, subcutaneous with 2-0 Vicryl, and the skin was closed with nylon. Incisions were washed and dried. Sterile dressings were applied. The patient was placed in an abduction pillow and sling.       The patient tolerated the procedure well, was extubated  and taken to the recovery room.       Jonnathan Alegria MD     Date: 7/24/2025  Time: 13:51 EDT

## 2025-07-24 NOTE — ANESTHESIA PROCEDURE NOTES
Peripheral Block      Patient reassessed immediately prior to procedure    Patient location during procedure: pre-op  Reason for block: at surgeon's request and post-op pain management  Preanesthetic Checklist  Completed: patient identified, IV checked, site marked, risks and benefits discussed, surgical consent, monitors and equipment checked, pre-op evaluation and timeout performed  Prep:  Pt Position: supine (HOB elevated)  Sterile barriers:cap, washed/disinfected hands, sterile barriers, gloves, mask, partial drape and alcohol skin prep  Prep: ChloraPrep  Patient monitoring: blood pressure monitoring, continuous pulse oximetry and EKG  Procedure    Sedation: yes  Performed under: local infiltration  Guidance:ultrasound guided and nerve stimulator    ULTRASOUND INTERPRETATION.  Using ultrasound guidance a 22 G gauge needle was placed in close proximity to the brachial plexus nerve, at which point, under ultrasound guidance anesthetic was injected in the area of the nerve and spread of the anesthesia was seen on ultrasound in close proximity thereto.  There were no abnormalities seen on ultrasound; a digital image was taken; and the patient tolerated the procedure with no complications. Images:still images obtained, printed/placed on chart    Laterality:left  Block Type:interscalene  Injection Technique:single-shot  Needle Type:echogenic  Needle Gauge:22 G (2in)  Resistance on Injection: none    Medications Used: bupivacaine-EPINEPHrine PF (MARCAINE w/EPI) 0.5% -[id number] injection - Injection   30 mL - 7/24/2025 11:01:00 AM      Post Assessment  Injection Assessment: negative aspiration for heme, no paresthesia on injection and incremental injection  Patient Tolerance:comfortable throughout block  Complications:no  Additional Notes  The block or continuous infusion is requested by the referring physician for management of postoperative pain, or pain related to a procedure. Ultrasound guidance (deemed medically  necessary). Painless injection, pt was awake and conversant during the procedure without complications. Needle and surrounding structures visualized throughout procedure. No adverse reactions or complications seen during this period. Post-procedure image showed no signs of complication, and anatomy was consistent with an uncomplicated nerve blockade.  Performed by: Nigel Salinas MD

## 2025-07-24 NOTE — DISCHARGE INSTRUCTIONS
DISCHARGE INSTRUCTIONS  Post-Operative  Arthroscopic Shoulder Surgery      For your surgery you had:  General anesthesia (you may have a sore throat for the first 24 hours)  IV sedation.  Local anesthesia  Monitored anesthesia care  You may experience dizziness, drowsiness, or light-headedness for several hours following surgery/procedure.  Do not stay alone today or tonight.  Limit your activity for 24 hours.  Resume your diet slowly.  Follow whatever special dietary instructions you may have been given by your doctor.  You should not drive or operate machinery or drink alcohol for 24 hours or while you are taking pain medication.  You should not sign legally binding documents.  Post-Operative Day #1 is counted as the 1st day after surgery.  [x] If you had a regional block, you will not have control of your arm for up to 12 hours.  Protect the arm with a sling or follow your physician's specific instructions.  Post-Operative Day #2 Saturday 7/26/25  Remove your dressing.  Under the dressing you will either have sutures or staples.  Change dressing once or twice daily.  Place a dry dressing or band-aids over the small incisions.  Prior to dressing change, wash your hands.  Post-Operative Day #4 Monday 7/28/25  You may shower.  During the shower, you may let the water hit the incision and roll down but do not scrub or place any soap on the incision.  Do not soak it.  Pat it dry and do not put any ointments on the incision unless directed by surgeon. Then replace the dry dressing.    General Instructions  [x] Use ice pack to shoulder continuously.  This can help with reducing swelling and pain relief. Ice packs stay cold for 4 hours, then replace with new ice packs.  Never place ice directly on skin.  Avoid getting dressing wet.  Keep arm elevated with sling to decrease swelling and minimize throbbing pain.  The sling will provide support for your shoulder.  It is important to remove the sling 3-5 times daily to work  on range-of-motion of the elbow, wrist and hand.  You will receive a prescription for physical therapy, unless otherwise instructed by physician.  After most shoulder surgeries, it is permissible to start exercises by slowing trying to crawl your fingers up a wall until your arm is parallel to the floor.  While doing this support the affected arm at the elbow or closer to the shoulder.  You may also lean over and let the arm and hand do small or large circles or write the alphabet with your hanging arm to keep it loose.  It is normal to have some pain with these gentle exercises.  The exercises help reduce swelling and pain overall and help to avoid a stiff shoulder post-operatively.  It is okay to come out of the sling for showering or for certain activities of daily living but avoid any lifting or carrying with the affected arm until instructed by the physician especially if you have had a repair of the rotator cuff or some type of reconstructive procedure.  It is okay to come out of the sling and support the arm with a pillow if you remain sedentary.  In general, sling use is preferred following a repair or reconstruction for 4 weeks.  If you have had a SAD (sub acromial decompression), continue sling use more liberally because there was not a repair or reconstruction that could be harmed.          DISCHARGE INSTRUCTIONS  Post-Operative   Arthroscopic Shoulder Surgery      Exercise fingers for 10 minutes every hour while awake.  The physician will typically inform the family and the patient whether or not a repair or reconstruction could be harmed.  If you have had a rotator cuff repair or reconstructive procedure, do not let the arm drop down suddenly from an elevated position down to your side despite improvements made in pain and over the 3 months following repair and reconstruction.  It generally takes 8-12 weeks before the repair or reconstruction does not rely on sutures and anchors that are placed for the  repair.  You are generally given a prescription for a narcotic medication.  Take as prescribed.  You may use over-the-counter anti-inflammatory medications such as Motrin or Aleve for additional pain or fever reduction if not allergic.  If you are taking the pain medication prescribed, do not take Tylenol too unless you consult with the pharmacist. The pain medications generally have Tylenol in them and too much Tylenol can be harmful.  Sometimes it is recommended to take an anti-inflammatory in between doses of prescription pain medication if additional pain relief is needed.  Consult with your physician or your pharmacist before taking over-the-counter pain medications/anti-inflammatories.  It is not uncommon to have a fever post-operatively especially in the first 24-48 hours.  Anti-inflammatories can be used for fever reduction also.  It is normal to have some redness or irritation around skin sutures or staples, however, if you have any expanding areas of redness with a persistent fever and increasing pain notify the physician as soon as possible.  The incidence of blood clots is low following arthroscopic procedures, however, if you notice any increasing pain or swelling in your calves or legs, contact the physician as soon as possible.   It is difficult to sleep in the customary fashion following a shoulder surgery.  It is usually necessary to sleep with the shoulder above the level of the heart such as in a recliner, couch or with the head of the bed elevated.  This should slowly improve over the weeks following shoulder surgery.  If unable to urinate 6 to 8 hours after surgery or urinating frequently in small amounts, notify the physician or go to the nearest Emergency Room.  SPECIAL INSTRUCTIONS:        NOTIFY YOUR PHYSICIAN IF YOU EXPERIENCE:  Numbness of fingers.  Inability to move fingers.  Extreme coldness, paleness or blue dis-coloration of fingers.  Any pain other than from the incision, such as  swelling of the arm that blocks circulation of fingers.  Follow verbal instructions from your doctor.  Medications per physician instructions as indicated on Discharge Medication Information Sheet.  You should see for Breanna Duvall follow-up care  on  Wednesday Jul 30, 2025 8:45 AM (Arrive by 8:30 AM)   Phone number: [phone]   Missing your appointment/follow-up could lead to serious complications  If you have an emergency and cannot reach your doctor, go to an Emergency Room nearest your home.

## 2025-07-24 NOTE — ANESTHESIA POSTPROCEDURE EVALUATION
Patient: Sherly Jo    Procedure Summary       Date: 07/24/25 Room / Location: [facility] OR  / [facility]    Anesthesia Start: 1157 Anesthesia Stop: 1334    Procedure: LEFT SHOULDER ARTHROSCOPY WITH ROTATOR CUFF REPAIR SUBACROMIAL DECOMPRESSION DISTAL CLAVICULECTOMY (Left: Shoulder) Diagnosis:       Complete tear of left rotator cuff, unspecified whether traumatic      (Complete tear of left rotator cuff, unspecified whether traumatic [M75.122])    Surgeons: Jonnathan Alegria MD Provider: Nigel Salinas MD    Anesthesia Type: general with block ASA Status: 2            Anesthesia Type: general with block    Vitals  Vitals Value Taken Time   BP 96/73 07/24/25 14:29   Temp     Pulse 55 07/24/25 14:30   Resp     SpO2 96 % 07/24/25 14:29   Vitals shown include unfiled device data.        Post Anesthesia Care and Evaluation    Patient location during evaluation: bedside  Patient participation: complete - patient participated  Level of consciousness: awake and alert  Pain management: adequate    Airway patency: patent  Anesthetic complications: No anesthetic complications  PONV Status: none  Cardiovascular status: acceptable  Respiratory status: acceptable  Hydration status: acceptable    Comments: An Anesthesiologist personally participated in the most demanding procedures (including induction and emergence if applicable) in the anesthesia plan, monitored the course of anesthesia administration at frequent intervals and remained physically present and available for immediate diagnosis and treatment of emergencies.

## 2025-08-06 ENCOUNTER — OFFICE VISIT (OUTPATIENT)
Dept: ORTHOPEDIC SURGERY | Facility: CLINIC | Age: 60
End: 2025-08-06
Payer: COMMERCIAL

## 2025-08-06 VITALS
HEIGHT: 72 IN | HEART RATE: 71 BPM | SYSTOLIC BLOOD PRESSURE: 113 MMHG | OXYGEN SATURATION: 96 % | WEIGHT: 235.89 LBS | DIASTOLIC BLOOD PRESSURE: 70 MMHG | BODY MASS INDEX: 31.95 KG/M2

## 2025-08-06 DIAGNOSIS — S46.012D TRAUMATIC COMPLETE TEAR OF LEFT ROTATOR CUFF, SUBSEQUENT ENCOUNTER: ICD-10-CM

## 2025-08-06 DIAGNOSIS — Z98.890 S/P ARTHROSCOPY OF LEFT SHOULDER: Primary | ICD-10-CM

## 2025-08-06 DIAGNOSIS — M25.512 ACUTE PAIN OF LEFT SHOULDER: ICD-10-CM

## 2025-08-06 DIAGNOSIS — S49.92XS SHOULDER INJURY, LEFT, SEQUELA: ICD-10-CM

## 2025-08-06 RX ORDER — OXYCODONE AND ACETAMINOPHEN 7.5; 325 MG/1; MG/1
1 TABLET ORAL EVERY 4 HOURS PRN
Qty: 42 TABLET | Refills: 0 | Status: SHIPPED | OUTPATIENT
Start: 2025-08-06

## 2025-08-06 RX ORDER — IBUPROFEN 800 MG/1
800 TABLET, FILM COATED ORAL EVERY 6 HOURS PRN
Qty: 20 TABLET | Refills: 0 | Status: SHIPPED | OUTPATIENT
Start: 2025-08-06

## 2025-08-09 LAB
QT INTERVAL: 429 MS
QTC INTERVAL: 423 MS

## (undated) DEVICE — 90-S CRUISE, SUCTION PROBE, NON-BENDABLE, MAX CUT LEVEL 1: Brand: SERFAS ENERGY

## (undated) DEVICE — ENCORE® LATEX ORTHO SIZE 8, STERILE LATEX POWDER-FREE SURGICAL GLOVE: Brand: ENCORE

## (undated) DEVICE — DISPOSABLE TOURNIQUET CUFF SINGLE BLADDER, SINGLE PORT AND QUICK CONNECT CONNECTOR: Brand: COLOR CUFF

## (undated) DEVICE — PENCL SMOKE/EVAC MEGADYNE TELESCP 10FT

## (undated) DEVICE — INTENDED FOR TISSUE SEPARATION, AND OTHER PROCEDURES THAT REQUIRE A SHARP SURGICAL BLADE TO PUNCTURE OR CUT.: Brand: BARD-PARKER ® CARBON RIB-BACK BLADES

## (undated) DEVICE — EXTREMITY-LF: Brand: MEDLINE INDUSTRIES, INC.

## (undated) DEVICE — BLD OPTH BEAVER/MINIBLADE STR 180DEG DBL/BVL SS

## (undated) DEVICE — BNDG GZ SOF-FORM CONFRM 2X75IN LF STRL

## (undated) DEVICE — BNDG ESMARK 4IN 12FT LF STRL BLU

## (undated) DEVICE — UNDERCAST PADDING: Brand: DEROYAL

## (undated) DEVICE — STERILE POLYISOPRENE POWDER-FREE SURGICAL GLOVES: Brand: PROTEXIS

## (undated) DEVICE — SHOULDER ARTHROSCOPY-LF: Brand: MEDLINE INDUSTRIES, INC.

## (undated) DEVICE — NDL HYPO ECLPS SFTY 22G 1 1/2IN

## (undated) DEVICE — TP NDL HD/SCORPION W/MEGALOADER 1P/U

## (undated) DEVICE — BNDG COMPR S/ADHR 1IN 5YD TN NS

## (undated) DEVICE — DRSNG GZ PETROLTM XEROFORM CURAD 1X8IN STRL

## (undated) DEVICE — SUT ETHLN 4/0 FS2 18IN 662H

## (undated) DEVICE — GLOVE,SURG,SENSICARE SLT,LF,PF,7: Brand: MEDLINE

## (undated) DEVICE — SLV DISTRACTION STAR VELCRO XL DISP

## (undated) DEVICE — TBG INFLOW/OUTFLOW DUALWAVE 1P/U

## (undated) DEVICE — BNDG ELAS ECON W/CLIP 4IN 5YD LF STRL

## (undated) DEVICE — BLD CUT FORMLA AGGR PLS 4.0MM

## (undated) DEVICE — SOL IRR NACL 0.9PCT 3000ML

## (undated) DEVICE — [THREADED CANNULA.  DO NOT RESTERILIZE,  DO NOT USE IF PACKAGE IS DAMAGED]: Brand: DRI-LOK

## (undated) DEVICE — GAUZE,SPONGE,4"X4",16PLY,STRL,LF,10/TRAY: Brand: MEDLINE

## (undated) DEVICE — T-DRAPE,EXTREMITY,STERILE: Brand: MEDLINE

## (undated) DEVICE — SUT ETHLN 3-0 FS118IN 663H

## (undated) DEVICE — UNDYED BRAIDED (POLYGLACTIN 910), SYNTHETIC ABSORBABLE SUTURE: Brand: COATED VICRYL

## (undated) DEVICE — GLV SURG SENSICARE SLT PF LF 7 STRL

## (undated) DEVICE — SUT VIC UD BR COAT 0 CP2 27IN

## (undated) DEVICE — DRESSING,GAUZE,XEROFORM,CURAD,1"X8",ST: Brand: CURAD

## (undated) DEVICE — GLV SURG ULTRAFREE MAX LTX PF 8

## (undated) DEVICE — BUR BRL FORMLA 12FLUT 4MM

## (undated) DEVICE — APPL CHLORAPREP HI/LITE 26ML ORNG

## (undated) DEVICE — THE STERILE LIGHT HANDLE COVER IS USED WITH STERIS SURGICAL LIGHTING AND VISUALIZATION SYSTEMS.

## (undated) DEVICE — GLV SURG SENSICARE W/ALOE PF LF 7 STRL

## (undated) DEVICE — STERILE POLYISOPRENE POWDER-FREE SURGICAL GLOVES WITH EMOLLIENT COATING: Brand: PROTEXIS